# Patient Record
Sex: FEMALE | Race: WHITE | NOT HISPANIC OR LATINO | Employment: UNEMPLOYED | URBAN - METROPOLITAN AREA
[De-identification: names, ages, dates, MRNs, and addresses within clinical notes are randomized per-mention and may not be internally consistent; named-entity substitution may affect disease eponyms.]

---

## 2018-01-23 DIAGNOSIS — M25.531 RIGHT WRIST PAIN: Primary | ICD-10-CM

## 2018-01-25 ENCOUNTER — OFFICE VISIT (OUTPATIENT)
Dept: ORTHOPEDICS | Facility: CLINIC | Age: 43
End: 2018-01-25
Payer: OTHER GOVERNMENT

## 2018-01-25 ENCOUNTER — HOSPITAL ENCOUNTER (OUTPATIENT)
Dept: RADIOLOGY | Facility: HOSPITAL | Age: 43
Discharge: HOME | End: 2018-01-25
Attending: ORTHOPAEDIC SURGERY
Payer: OTHER GOVERNMENT

## 2018-01-25 VITALS
BODY MASS INDEX: 41.07 KG/M2 | WEIGHT: 271 LBS | SYSTOLIC BLOOD PRESSURE: 142 MMHG | HEIGHT: 68 IN | DIASTOLIC BLOOD PRESSURE: 72 MMHG | HEART RATE: 80 BPM

## 2018-01-25 DIAGNOSIS — M25.531 RIGHT WRIST PAIN: ICD-10-CM

## 2018-01-25 DIAGNOSIS — M19.031 DRUJ (DISTAL RADIOULNAR JOINT) ARTHROSIS, PRIMARY, RIGHT: Primary | ICD-10-CM

## 2018-01-25 DIAGNOSIS — M25.531 RIGHT WRIST PAIN: Primary | ICD-10-CM

## 2018-01-25 PROCEDURE — 99213 OFFICE O/P EST LOW 20 MIN: CPT | Mod: PBBFAC,25,PN | Performed by: ORTHOPAEDIC SURGERY

## 2018-01-25 PROCEDURE — 99999 PR PBB SHADOW E&M-EST. PATIENT-LVL III: CPT | Mod: PBBFAC,,, | Performed by: ORTHOPAEDIC SURGERY

## 2018-01-25 PROCEDURE — 73110 X-RAY EXAM OF WRIST: CPT | Mod: TC,PN,RT

## 2018-01-25 PROCEDURE — 99213 OFFICE O/P EST LOW 20 MIN: CPT | Mod: S$PBB,,, | Performed by: ORTHOPAEDIC SURGERY

## 2018-01-25 PROCEDURE — 73110 X-RAY EXAM OF WRIST: CPT | Mod: 26,RT,, | Performed by: RADIOLOGY

## 2018-01-25 RX ORDER — LEVOTHYROXINE SODIUM 50 UG/1
50 TABLET ORAL DAILY
COMMUNITY
End: 2020-01-21 | Stop reason: SDUPTHER

## 2018-01-25 NOTE — PROGRESS NOTES
History reviewed. No pertinent past medical history.    History reviewed. No pertinent surgical history.    Current Outpatient Prescriptions   Medication Sig    butalbital-acetaminophen-caffeine -40 mg (FIORICET, ESGIC) -40 mg per tablet     diphenoxylate-atropine 2.5-0.025 mg (LOMOTIL) 2.5-0.025 mg per tablet TAKE 1 TABLET BY MOUTH EVERY 8 TO 12 HOURS AS NEEDED    levothyroxine (SYNTHROID) 50 MCG tablet Take 50 mcg by mouth once daily.     No current facility-administered medications for this visit.        No Known Allergies    History reviewed. No pertinent family history.    Social History     Social History    Marital status: Single     Spouse name: N/A    Number of children: N/A    Years of education: N/A     Occupational History    Not on file.     Social History Main Topics    Smoking status: Never Smoker    Smokeless tobacco: Never Used    Alcohol use No    Drug use: No    Sexual activity: Not on file     Other Topics Concern    Not on file     Social History Narrative    No narrative on file       Chief Complaint:   Chief Complaint   Patient presents with    Right Wrist - Pain       History of present illness: This is a 42-year-old right-hand-dominant female seen for further right hand pain.  Patient's had right wrist pain now for about 2 years.  She thinks it started sometime in June of 2016.  There was no injury or trauma.  Ibuprofen helps.  She has pain but it is not constant.  Periodic swelling.  Pain when picking things up.  She rates her pain today as a 0 out of 10.  Symptoms are stable and mild to moderate.  Patient did have an MRI done at [facility] which showed DRUJ effusion and soft tissue edema.  I gave her a brace about a year and a half ago.  It helps to some degree.  She also has periodic numbness and tingling at night but most of her pain is near the dorsum of the DRUJ.      Review of Systems:    Constitution: Negative for chills, fever, and sweats.   Negative for unexplained weight loss.    HENT:  Negative for headaches and blurry vision.    Cardiovascular:Negative for chest pain or irregular heart beat. Negative for hypertension.    Respiratory:  Negative for cough and shortness of breath.    Gastrointestinal: Negative for abdominal pain, heartburn, melena, nausea, and vomitting.    Genitourinary:  Negative bladder incontinence and dysuria.    Musculoskeletal:  See HPI    Neurological: Positive for numbness.    Psychiatric/Behavioral: Negative for depression.  The patient is not nervous/anxious.      Endocrine: Negative for polyuria    Hematologic/Lymphatic: Negative for bleeding problem.  Does not bruise/bleed easily.    Skin: Negative for poor would healing and rash      Physical Examination:    Vital Signs:    Vitals:    01/25/18 0812   BP: (!) 142/72   Pulse: 80       Body mass index is 41.21 kg/m².    This a well-developed, well nourished patient in no acute distress.  They are alert and oriented and cooperative to examination.  Pt. walks without an antalgic gait.      Examination of the right hand and wrist shows no signs of rashes or erythema. Patient has very minimal swelling and prominence around the DRUJ and the dorsal ulnar aspect of the wrist.  Some laxity which shuck test.  Patient has full range of motion of the wrist in flexion and extension as well as ulnar and radial deviation. The patient also has full range of motion of all joints in the hand. There are 2+ radial pulse and intact light touch sensation in all 5 digits. Nontender over the anatomic snuffbox. Negative Tinel's. Negative Finkelstein's test.     Examination of the left hand and wrist shows no signs of rashes or erythema. Patient has no masses ecchymosis or effusions. Patient has full range of motion of the wrist in flexion and extension as well as ulnar and radial deviation. The patient also has full range of motion of all joints in the hand. There are 2+ radial pulse and intact  light touch sensation in all 5 digits. Nontender over the anatomic snuffbox. Negative Tinel's. Negative Finkelstein's test.     X-rays: 3 views of the right wrist are ordered and reviewed which show no abnormal findings    MRI of the right wrist performed at [facility] dated July 29, 2016: Effusion within the distal radial ulnar joint associated with mild adjacent soft tissue edema and perhaps minimal marrow edema within the distal radius.  Imaging is nonspecific.     Assessment:: Right DRUJ swelling and laxity    Plan:  I reviewed the x-rays and the MRI findings with her today.  I recommended some hand therapy to hopefully stabilize and strengthen the wrist.  We briefly talked about some kind of surgery for DRUJ stability.  The symptoms are not that bad to warrant that at this time.

## 2018-03-09 ENCOUNTER — CLINICAL SUPPORT (OUTPATIENT)
Dept: REHABILITATION | Facility: HOSPITAL | Age: 43
End: 2018-03-09
Attending: ORTHOPAEDIC SURGERY
Payer: OTHER GOVERNMENT

## 2018-03-09 DIAGNOSIS — G89.29 WRIST PAIN, CHRONIC, RIGHT: Primary | ICD-10-CM

## 2018-03-09 DIAGNOSIS — M25.531 WRIST PAIN, CHRONIC, RIGHT: Primary | ICD-10-CM

## 2018-03-09 PROCEDURE — 97110 THERAPEUTIC EXERCISES: CPT | Mod: PN

## 2018-03-09 PROCEDURE — G8988 SELF CARE GOAL STATUS: HCPCS | Mod: CH,PN

## 2018-03-09 PROCEDURE — G8987 SELF CARE CURRENT STATUS: HCPCS | Mod: CI,PN

## 2018-03-09 PROCEDURE — 97165 OT EVAL LOW COMPLEX 30 MIN: CPT | Mod: PN

## 2018-03-19 NOTE — PLAN OF CARE
TIME RECORD    Date: 03/09/2018    Start Time:  1100  Stop Time:  1145    PROCEDURES:    TIMED  Procedure Time Min.   exercise Start:1115  Stop:1145 30    Start:  Stop:     Start:  Stop:     Start:  Stop:          UNTIMED  Procedure Time Min.   evaluation Start:1100  Stop:1115 15    Start:  Stop:      Total Timed Minutes:  30  Total Timed Units:  15  Total Untimed Units:  2  Charges Billed/# of units:  1    OCCUPATIONAL THERAPY INITIAL EVALUATION & PLAN OF TREATMENT    Patient Name: Tori Escalona  Physician Name:  Kisha  Primary Diagnosis:  R wrist pain  Treatment Diagnosis:  same  Onset Date: > 3 months  Eval Date:  3-9-18  Certification Period:  3-9-18 to 6-9-18  Past Medical History: No past medical history on file.  Precautions:  universal  Prior Therapy:  no  Signs of Abuse: no  Medications: Tori Escalona has a current medication list which includes the following prescription(s): butalbital-acetaminophen-caffeine -40 mg, diphenoxylate-atropine 2.5-0.025 mg, and levothyroxine.  Nutrition:  WNL    Prior Level of Function: Independent  Social History:  Independent, works full time, lives with family  Place of Residence (steps/adaptations):  No concerns  Functional Deficits Leading to Referral/Nature of Injury:  Pain limits ADL at times  Patient Therapy Goals:  Pain free R wrist  Initial Assessment:  Pt presents with report of intermittent 3/10 pain. R wrist ROM WNL. Mild edema noted dorsum R wrist.      R UE (dominant) 50#  L UE 70#    Pinch R UE 15#  L UE 22#  R wrist flexion 45 degrees  R wrist extension 70 degrees  R radial deviation 18 degrees  R ulnar deviation 23 degrees      Rehab Potential: good  Short Term Goals 1. Compliant with HEP 2. Increase PROM WFL 3. Pain free ROM  Long Term Goals 1.Compliant with HEP including strengthening. 2. Pain free use R wrist/hhand 3. Equal circumference B wrists  Recommended Treatment Plan (2 times per week for 8 weeks): Therapeutic Exercise, Functional  Activities, Patient Education, Home Exercise Program and Manual Therapy  Other Recommendations:  Issued and instructed patient in gentle ROM and stretch of wrist flexion, extension and nerve gliding to be done daily.    Therapist's Name: GIORGI Spencer   Date: 03/19/2018    I CERTIFY THE NEED FOR THESE SERVICES FURNISHED UNDER THIS PLAN OF TREATMENT AND WHILE UNDER MY CARE    Physician's comments: ________________________________________________________________________________________________________________________________________________      Physician's Name: ___________________________________

## 2018-04-19 ENCOUNTER — CLINICAL SUPPORT (OUTPATIENT)
Dept: REHABILITATION | Facility: HOSPITAL | Age: 43
End: 2018-04-19
Attending: ORTHOPAEDIC SURGERY
Payer: OTHER GOVERNMENT

## 2018-04-19 DIAGNOSIS — G89.29 WRIST PAIN, CHRONIC, RIGHT: Primary | ICD-10-CM

## 2018-04-19 DIAGNOSIS — M25.531 WRIST PAIN, CHRONIC, RIGHT: Primary | ICD-10-CM

## 2018-04-19 PROCEDURE — 97140 MANUAL THERAPY 1/> REGIONS: CPT | Mod: PN

## 2018-04-19 PROCEDURE — 97110 THERAPEUTIC EXERCISES: CPT | Mod: PN

## 2018-04-19 NOTE — PROGRESS NOTES
TIME RECORD    Date:  04/19/2018    Start Time:  1010  Stop Time:  1053    PROCEDURES:    TIMED  Procedure Time Min.   exercise Start:1010  Stop:1053 43    Start:  Stop:     Start:  Stop:     Start:  Stop:          UNTIMED  Procedure Time Min.    Start:  Stop:     Start:  Stop:      Total Timed Minutes:  43  Total Timed Units:  3  Total Untimed Units:  0  Charges Billed/# of units:  3      Progress/Current Status    Subjective:     Patient ID: Tori Escalona is a 42 y.o. female.  Diagnosis:   1. Wrist pain, chronic, right       Pain: 3 /10      Objective:     Patient arrived to appointment (30 days since evaluation) Pt reports not doing HEP or regular icing but also reports R wrist pain unchanged. , pinch and ROM unchanged from evaluation date. Edema dorsum wrist. Instructed pt in tendon gliding exercises after applying US to right wrist.    Assessment:     First visit since evaluation. Pt has not been compliant with initial recommndations.    Patient Education/Response:     ongoing    Plans and Goals:     Refer to Tommy VINCENT

## 2018-04-30 ENCOUNTER — CLINICAL SUPPORT (OUTPATIENT)
Dept: REHABILITATION | Facility: HOSPITAL | Age: 43
End: 2018-04-30
Attending: ORTHOPAEDIC SURGERY
Payer: OTHER GOVERNMENT

## 2018-04-30 DIAGNOSIS — M25.431 RIGHT WRIST EFFUSION: ICD-10-CM

## 2018-04-30 DIAGNOSIS — M25.631 STIFFNESS OF JOINT OF RIGHT FOREARM: Primary | ICD-10-CM

## 2018-04-30 DIAGNOSIS — M25.531 RIGHT WRIST PAIN: ICD-10-CM

## 2018-04-30 PROCEDURE — 98960 EDU&TRN PT SELF-MGMT NQHP 1: CPT | Mod: PN

## 2018-04-30 PROCEDURE — 97110 THERAPEUTIC EXERCISES: CPT | Mod: PN

## 2018-04-30 NOTE — PROGRESS NOTES
"Time in 9  Time out 945    untimed units    1 on 1 education                               Time:9-930    Timed units  1 therex                              Time:930-945      S:reports no trauma she can remember to correspond to symptoms she currently has however states broke r wrist when she was a child  Pain:  Diffuse ulna sides ache; rest 0/10, use up to 4 especially with active forearm rotation, sleep 0    O:    Prom               r                l  Sup/pro       70/90         90/90  Df/pf            90/90         90/90  Rd/ud          20/50         30/60    Wrist circ r 7", l 7"    Mild edema noted at r ulna styloid vs l    Explained special tests to pinpoint pain generator may be useful once sup hypomobility resolved, explained a druj brace will be ordered (see last ortho note for dx)    Told to avoid heavy or painful use, told to avoid rotation of forearm into supination due to stiffness present and fear of overworking ecu since this contributes to sup)    Sup stretches as tolerated-pain free, issued these for HEP    A:resolving sup stiffness should be first priority            P:special tests for ulna side wrist pain once sup hypomobility gone    6-21-18: d/c since patient had not attended since 4-30-18  "

## 2020-01-09 ENCOUNTER — TELEPHONE (OUTPATIENT)
Dept: FAMILY MEDICINE | Facility: CLINIC | Age: 45
End: 2020-01-09

## 2020-01-09 NOTE — TELEPHONE ENCOUNTER
----- Message from Rupal Alcantara sent at 1/9/2020  1:44 PM CST -----  Contact: Patient   VM @ patient called in regards to a referral stated she is scheduled for 1-21-20 @ 7:40 and is needing the referral sent before that [phone]

## 2020-01-10 ENCOUNTER — TELEPHONE (OUTPATIENT)
Dept: FAMILY MEDICINE | Facility: CLINIC | Age: 45
End: 2020-01-10

## 2020-01-10 DIAGNOSIS — E03.9 HYPOTHYROIDISM (ACQUIRED): ICD-10-CM

## 2020-01-10 DIAGNOSIS — Z00.00 ROUTINE GENERAL MEDICAL EXAMINATION AT A HEALTH CARE FACILITY: Primary | ICD-10-CM

## 2020-01-10 DIAGNOSIS — Z79.899 ENCOUNTER FOR LONG-TERM (CURRENT) USE OF OTHER MEDICATIONS: ICD-10-CM

## 2020-01-18 LAB
ALBUMIN SERPL-MCNC: 4.3 G/DL (ref 3.6–5.1)
ALBUMIN/GLOB SERPL: 1.4 (CALC) (ref 1–2.5)
ALP SERPL-CCNC: 54 U/L (ref 33–115)
ALT SERPL-CCNC: 13 U/L (ref 6–29)
AST SERPL-CCNC: 13 U/L (ref 10–30)
BASOPHILS # BLD AUTO: 31 CELLS/UL (ref 0–200)
BASOPHILS NFR BLD AUTO: 0.5 %
BILIRUB SERPL-MCNC: 0.6 MG/DL (ref 0.2–1.2)
BUN SERPL-MCNC: 13 MG/DL (ref 7–25)
BUN/CREAT SERPL: NORMAL (CALC) (ref 6–22)
CALCIUM SERPL-MCNC: 9.1 MG/DL (ref 8.6–10.2)
CHLORIDE SERPL-SCNC: 105 MMOL/L (ref 98–110)
CHOLEST SERPL-MCNC: 140 MG/DL
CHOLEST/HDLC SERPL: 3.6 (CALC)
CO2 SERPL-SCNC: 28 MMOL/L (ref 20–32)
CREAT SERPL-MCNC: 0.75 MG/DL (ref 0.5–1.1)
EOSINOPHIL # BLD AUTO: 110 CELLS/UL (ref 15–500)
EOSINOPHIL NFR BLD AUTO: 1.8 %
ERYTHROCYTE [DISTWIDTH] IN BLOOD BY AUTOMATED COUNT: 12 % (ref 11–15)
GFRSERPLBLD MDRD-ARVRAT: 98 ML/MIN/1.73M2
GLOBULIN SER CALC-MCNC: 3.1 G/DL (CALC) (ref 1.9–3.7)
GLUCOSE SERPL-MCNC: 93 MG/DL (ref 65–99)
HCT VFR BLD AUTO: 39.8 % (ref 35–45)
HDLC SERPL-MCNC: 39 MG/DL
HGB BLD-MCNC: 13.6 G/DL (ref 11.7–15.5)
LDLC SERPL CALC-MCNC: 85 MG/DL (CALC)
LYMPHOCYTES # BLD AUTO: 1732 CELLS/UL (ref 850–3900)
LYMPHOCYTES NFR BLD AUTO: 28.4 %
MCH RBC QN AUTO: 29.1 PG (ref 27–33)
MCHC RBC AUTO-ENTMCNC: 34.2 G/DL (ref 32–36)
MCV RBC AUTO: 85 FL (ref 80–100)
MONOCYTES # BLD AUTO: 421 CELLS/UL (ref 200–950)
MONOCYTES NFR BLD AUTO: 6.9 %
NEUTROPHILS # BLD AUTO: 3806 CELLS/UL (ref 1500–7800)
NEUTROPHILS NFR BLD AUTO: 62.4 %
NONHDLC SERPL-MCNC: 101 MG/DL (CALC)
PLATELET # BLD AUTO: 311 THOUSAND/UL (ref 140–400)
PMV BLD REES-ECKER: 10.3 FL (ref 7.5–12.5)
POTASSIUM SERPL-SCNC: 4.3 MMOL/L (ref 3.5–5.3)
PROT SERPL-MCNC: 7.4 G/DL (ref 6.1–8.1)
RBC # BLD AUTO: 4.68 MILLION/UL (ref 3.8–5.1)
SODIUM SERPL-SCNC: 137 MMOL/L (ref 135–146)
TRIGL SERPL-MCNC: 71 MG/DL
TSH SERPL-ACNC: 1.88 MIU/L
WBC # BLD AUTO: 6.1 THOUSAND/UL (ref 3.8–10.8)

## 2020-01-21 ENCOUNTER — OFFICE VISIT (OUTPATIENT)
Dept: FAMILY MEDICINE | Facility: CLINIC | Age: 45
End: 2020-01-21
Payer: OTHER GOVERNMENT

## 2020-01-21 VITALS
HEART RATE: 80 BPM | HEIGHT: 68 IN | WEIGHT: 278 LBS | DIASTOLIC BLOOD PRESSURE: 88 MMHG | BODY MASS INDEX: 42.13 KG/M2 | SYSTOLIC BLOOD PRESSURE: 128 MMHG

## 2020-01-21 DIAGNOSIS — R03.0 PREHYPERTENSION: ICD-10-CM

## 2020-01-21 DIAGNOSIS — Z00.00 ROUTINE PHYSICAL EXAMINATION: ICD-10-CM

## 2020-01-21 DIAGNOSIS — E03.9 ACQUIRED HYPOTHYROIDISM: Primary | ICD-10-CM

## 2020-01-21 PROCEDURE — 99396 PREV VISIT EST AGE 40-64: CPT | Mod: S$GLB,,, | Performed by: PHYSICIAN ASSISTANT

## 2020-01-21 PROCEDURE — 99396 PR PREVENTIVE VISIT,EST,40-64: ICD-10-PCS | Mod: S$GLB,,, | Performed by: PHYSICIAN ASSISTANT

## 2020-01-21 RX ORDER — LEVOTHYROXINE SODIUM 50 UG/1
50 TABLET ORAL DAILY
Qty: 90 TABLET | Refills: 1 | Status: SHIPPED | OUTPATIENT
Start: 2020-01-21 | End: 2020-07-20 | Stop reason: SDUPTHER

## 2020-01-21 NOTE — PROGRESS NOTES
SUBJECTIVE:    Patient ID: Tori Escalona is a 44 y.o. female.    Chief Complaint: Annual Exam (Pt presents for annual exam.....mlr) and Medication Refill (Med bottle present and reconciled.....pharmacy verified.....mlr)    44-year-old white female presents today for regular checkup.  She reports she has been doing well overall with no major concerns or complaints at this time.  She just had some blood work done for my review. Just treated for hypothyroidism.  TSH appears to be in range. She says that she has been trying to do a low carb type diet to help with weight loss. She feels like she is doing better. Bp has never been elevated and today in the borderline range. Pt reports good amount of stress this morning getting out of the house with sick child. Wishes to watch it closely over the next few months.      Telephone on 01/10/2020   Component Date Value Ref Range Status    WBC 01/17/2020 6.1  3.8 - 10.8 Thousand/uL Final    RBC 01/17/2020 4.68  3.80 - 5.10 Million/uL Final    Hemoglobin 01/17/2020 13.6  11.7 - 15.5 g/dL Final    Hematocrit 01/17/2020 39.8  35.0 - 45.0 % Final    Mean Corpuscular Volume 01/17/2020 85.0  80.0 - 100.0 fL Final    Mean Corpuscular Hemoglobin 01/17/2020 29.1  27.0 - 33.0 pg Final    Mean Corpuscular Hemoglobin Conc 01/17/2020 34.2  32.0 - 36.0 g/dL Final    RDW 01/17/2020 12.0  11.0 - 15.0 % Final    Platelets 01/17/2020 311  140 - 400 Thousand/uL Final    MPV 01/17/2020 10.3  7.5 - 12.5 fL Final    Neutrophils Absolute 01/17/2020 3,806  1,500 - 7,800 cells/uL Final    Lymph # 01/17/2020 1,732  850 - 3,900 cells/uL Final    Mono # 01/17/2020 421  200 - 950 cells/uL Final    Eos # 01/17/2020 110  15 - 500 cells/uL Final    Baso # 01/17/2020 31  0 - 200 cells/uL Final    Neutrophils Relative 01/17/2020 62.4  % Final    Lymph% 01/17/2020 28.4  % Final    Mono% 01/17/2020 6.9  % Final    Eosinophil% 01/17/2020 1.8  % Final    Basophil% 01/17/2020 0.5  % Final     Glucose 01/17/2020 93  65 - 99 mg/dL Final    BUN, Bld 01/17/2020 13  7 - 25 mg/dL Final    Creatinine 01/17/2020 0.75  0.50 - 1.10 mg/dL Final    eGFR if non African American 01/17/2020 98  > OR = 60 mL/min/1.73m2 Final    eGFR if African American 01/17/2020 113  > OR = 60 mL/min/1.73m2 Final    BUN/Creatinine Ratio 01/17/2020 NOT APPLICABLE  6 - 22 (calc) Final    Sodium 01/17/2020 137  135 - 146 mmol/L Final    Potassium 01/17/2020 4.3  3.5 - 5.3 mmol/L Final    Chloride 01/17/2020 105  98 - 110 mmol/L Final    CO2 01/17/2020 28  20 - 32 mmol/L Final    Calcium 01/17/2020 9.1  8.6 - 10.2 mg/dL Final    Total Protein 01/17/2020 7.4  6.1 - 8.1 g/dL Final    Albumin 01/17/2020 4.3  3.6 - 5.1 g/dL Final    Globulin, Total 01/17/2020 3.1  1.9 - 3.7 g/dL (calc) Final    Albumin/Globulin Ratio 01/17/2020 1.4  1.0 - 2.5 (calc) Final    Total Bilirubin 01/17/2020 0.6  0.2 - 1.2 mg/dL Final    Alkaline Phosphatase 01/17/2020 54  33 - 115 U/L Final    AST 01/17/2020 13  10 - 30 U/L Final    ALT 01/17/2020 13  6 - 29 U/L Final    Cholesterol 01/17/2020 140  <200 mg/dL Final    HDL 01/17/2020 39* >50 mg/dL Final    Triglycerides 01/17/2020 71  <150 mg/dL Final    LDL Cholesterol 01/17/2020 85  mg/dL (calc) Final    Hdl/Cholesterol Ratio 01/17/2020 3.6  <5.0 (calc) Final    Non HDL Chol. (LDL+VLDL) 01/17/2020 101  <130 mg/dL (calc) Final    TSH 01/17/2020 1.88  mIU/L Final       Past Medical History:   Diagnosis Date    Thyroid disease      History reviewed. No pertinent surgical history.  Family History   Problem Relation Age of Onset    Osteoporosis Mother     Hypertension Father        Marital Status:   Alcohol History:  reports that she does not drink alcohol.  Tobacco History:  reports that she has never smoked. She has never used smokeless tobacco.  Drug History:  reports that she does not use drugs.    Review of patient's allergies indicates:  No Known Allergies    Current Outpatient  "Medications:     levothyroxine (SYNTHROID) 50 MCG tablet, Take 1 tablet (50 mcg total) by mouth once daily., Disp: 90 tablet, Rfl: 1    Review of Systems   Constitutional: Negative for appetite change, chills, fatigue, fever and unexpected weight change.   HENT: Negative for congestion.    Respiratory: Negative for cough, chest tightness and shortness of breath.    Cardiovascular: Negative for chest pain and palpitations.   Gastrointestinal: Negative for abdominal distention and abdominal pain.   Endocrine: Negative for cold intolerance and heat intolerance.   Genitourinary: Negative for difficulty urinating and dysuria.   Musculoskeletal: Negative for arthralgias and back pain.   Neurological: Negative for dizziness, weakness and headaches.          Objective:      Vitals:    01/21/20 0753 01/21/20 0801 01/21/20 0812   BP: (!) 132/100 (!) 128/98 128/88   Pulse: 80     Weight: 126.1 kg (278 lb)     Height: 5' 8" (1.727 m)       Physical Exam   Constitutional: She is oriented to person, place, and time. She appears well-developed and well-nourished. No distress.   HENT:   Head: Normocephalic and atraumatic.   Eyes: Pupils are equal, round, and reactive to light. Conjunctivae and EOM are normal.   Neck: Normal range of motion. Neck supple. No thyromegaly present.   Cardiovascular: Normal rate, regular rhythm, normal heart sounds and intact distal pulses.   Pulmonary/Chest: Effort normal and breath sounds normal.   Abdominal: Soft. Bowel sounds are normal. She exhibits no distension. There is no tenderness.   Musculoskeletal: Normal range of motion.   Neurological: She is alert and oriented to person, place, and time. No cranial nerve deficit.   Skin: Skin is warm and dry. No erythema.   Psychiatric: She has a normal mood and affect.         Assessment:       1. Acquired hypothyroidism    2. Routine physical examination    3. Prehypertension         Plan:       Acquired hypothyroidism  Comments:  TSH at goal. refills " of current dose. continue as is.  Orders:  -     levothyroxine (SYNTHROID) 50 MCG tablet; Take 1 tablet (50 mcg total) by mouth once daily.  Dispense: 90 tablet; Refill: 1    Routine physical examination  Comments:  She is doing well overall. we discussed borderline pressure and keeping an eye at home. Low carb diet.    Prehypertension  Comments:  will followup in 6 mos or sooner if she gets high readings at home.      Follow up in about 6 months (around 7/21/2020) for BP Check-Up.        1/21/2020 Rachid Nevarez PA-C

## 2020-01-21 NOTE — PATIENT INSTRUCTIONS
Hypothyroidism       You have hypothyroidism. This means your thyroid gland is not making enough thyroid hormone. This hormone is vital to body growth and metabolism. If you dont make enough, many body processes slow down. This can cause symptoms throughout the body. Hypothyroidism can range from mild to severe. The most severe form is called myxedema.  There are a number of causes of hypothyroidism. A common cause is Hashimotos disease. This disease causes the bodys own immune system to attack the thyroid gland. When you have certain treatments, such as surgery to remove the thyroid gland, this can also cause hypothyroidism.  Symptoms of hypothyroidism can include:  · Fatigue  · Trouble concentrating or thinking clearly; forgetfulness  · Dry skin  · Hair loss  · Weight gain  · Low tolerance to cold  · Constipation  · Depression  · Personality changes  · Tingling or prickling of the hands or feet  · Heavy, absent, or irregular periods (women only)  Older adults may sometimes have other symptoms. These can include:  · Muscle aches and weakness  · Confusion  · Incontinence (unable to control urine or stool)  · Trouble moving around  · Falling  Treatment for hypothyroidism involves taking thyroid hormone pills daily. These pills replace the hormone your thyroid doesnt make. You will likely need to take a daily pill for the rest of your life. Tips for taking this medicine are given below.  Home care  Tips for taking your medicine  · Take your thyroid hormone pills as prescribed by your healthcare provider. This is most often 1 pill a day on an empty stomach. Use a pillbox labeled with the days of the week. This will help you remember to take your pill each day.  · Dont take products that contain iron and calcium or antacids within 4 hours of taking your thyroid hormone pills.  · Dont take other medicines with your thyroid hormone pill without checking with your provider first.  · Tell your provider if you have  any side effects from your medicines that bother you.  · Never change the dosage or stop taking your thyroid pills without talking to your provider first.  General care  · Always talk with your provider before trying other medicines or treatments for your thyroid problem.  · If you see other healthcare providers, be sure to let them know about your thyroid problem.  Follow-up care  See your healthcare provider for checkups as advised. You may need regular tests to check the level of thyroid hormone in your blood.  When to seek medical advice  Call your healthcare provider right away if any of these occur:  · New symptoms develop  · Symptoms return, continue, or worsen even after treatment  · Extreme fatigue  · Puffy hands, face, or feet  · Fast or irregular heartbeat  · Confusion  Call 911  Call 911 right away if any of these occur:  · Fainting  · Chest pain  · Shortness of breath or trouble breathing  Date Last Reviewed: 8/24/2015  © 1357-2211 [employer]. [address] [place] [address], [place] [address]. All rights reserved. This information is not intended as a substitute for professional medical care. Always follow your healthcare professional's instructions.

## 2020-03-18 ENCOUNTER — TELEPHONE (OUTPATIENT)
Dept: FAMILY MEDICINE | Facility: CLINIC | Age: 45
End: 2020-03-18

## 2020-03-18 NOTE — TELEPHONE ENCOUNTER
Patient to continue to use OTC for another day or so, and if not better, will schedule a virtual visit.

## 2020-03-18 NOTE — TELEPHONE ENCOUNTER
----- Message from Anna Bates MA sent at 3/18/2020  2:52 PM CDT -----  Pt called in to report she is having a headache and sinus pressure with mild congestion.  She is taking OTC allergy medications (generic Sudafed), but was wondering if she needs to be taking something else.  Please contact patient    [facility] - [facility] on     Pt - [phone]

## 2020-03-23 ENCOUNTER — TELEPHONE (OUTPATIENT)
Dept: FAMILY MEDICINE | Facility: CLINIC | Age: 45
End: 2020-03-23

## 2020-03-23 NOTE — TELEPHONE ENCOUNTER
----- Message from Anna Bates MA sent at 3/23/2020  3:37 PM CDT -----  Pt would like a call back to set up a virtual visit,    Pt - [phone]

## 2020-03-24 ENCOUNTER — OFFICE VISIT (OUTPATIENT)
Dept: FAMILY MEDICINE | Facility: CLINIC | Age: 45
End: 2020-03-24
Payer: OTHER GOVERNMENT

## 2020-03-24 DIAGNOSIS — J01.00 ACUTE MAXILLARY SINUSITIS, RECURRENCE NOT SPECIFIED: Primary | ICD-10-CM

## 2020-03-24 PROCEDURE — 99213 OFFICE O/P EST LOW 20 MIN: CPT | Mod: 95,,, | Performed by: PHYSICIAN ASSISTANT

## 2020-03-24 PROCEDURE — 99213 PR OFFICE/OUTPT VISIT, EST, LEVL III, 20-29 MIN: ICD-10-PCS | Mod: 95,,, | Performed by: PHYSICIAN ASSISTANT

## 2020-03-24 RX ORDER — LEVOCETIRIZINE DIHYDROCHLORIDE 5 MG/1
5 TABLET, FILM COATED ORAL NIGHTLY
Qty: 30 TABLET | Refills: 11 | Status: SHIPPED | OUTPATIENT
Start: 2020-03-24 | End: 2022-07-14

## 2020-03-24 RX ORDER — AMOXICILLIN AND CLAVULANATE POTASSIUM 875; 125 MG/1; MG/1
1 TABLET, FILM COATED ORAL EVERY 12 HOURS
Qty: 20 TABLET | Refills: 0 | Status: SHIPPED | OUTPATIENT
Start: 2020-03-24 | End: 2020-04-03

## 2020-03-24 NOTE — PROGRESS NOTES
Subjective:        The chief complaint leading to consultation is: Sinus/allergy symptoms  The patient location is:  HOME  Visit type: Virtual visit with synchronous audio and video    44-year-old female presents for urgent visit via telemedicine conference.  She reports that she has had a week and a half worth of allergy and sinus symptoms.  This results and frontal and maxillary sinus pressure.  Her nasal drainage has changed from clear and thin to thicker and yellowish green in color.  She denies any fever or chills she also denies cough or feeling particularly short of breath.  She denies any sick contacts.  Her sons are off school and at home with her.  They are also suffering from allergies.  She has been using Mucinex 600 mg p.o. b.i.d. with some relief.      History reviewed. No pertinent surgical history.  Past Medical History:   Diagnosis Date    Thyroid disease      Family History   Problem Relation Age of Onset    Osteoporosis Mother     Hypertension Father         Social History:   Marital Status:   Alcohol History:  reports that she does not drink alcohol.  Tobacco History:  reports that she has never smoked. She has never used smokeless tobacco.  Drug History:  reports that she does not use drugs.    Review of patient's allergies indicates:  No Known Allergies    Current Outpatient Medications   Medication Sig Dispense Refill    amoxicillin-clavulanate 875-125mg (AUGMENTIN) 875-125 mg per tablet Take 1 tablet by mouth every 12 (twelve) hours. for 10 days 20 tablet 0    levocetirizine (XYZAL) 5 MG tablet Take 1 tablet (5 mg total) by mouth every evening. 30 tablet 11    levothyroxine (SYNTHROID) 50 MCG tablet Take 1 tablet (50 mcg total) by mouth once daily. 90 tablet 1     No current facility-administered medications for this visit.        Review of Systems   Constitutional: Negative for chills, fatigue and fever.   HENT: Positive for congestion, sinus pressure, sinus pain and  sneezing. Negative for ear discharge, ear pain, rhinorrhea, sore throat and trouble swallowing.    Eyes: Negative for pain, discharge, redness and itching.   Respiratory: Negative for cough, chest tightness and shortness of breath.    Cardiovascular: Negative for chest pain.   Gastrointestinal: Negative for abdominal distention and abdominal pain.   Neurological: Negative for weakness and headaches.         Objective:        Physical Exam:   Physical Exam   Constitutional: She appears well-developed and well-nourished. No distress.   HENT:   Head: Normocephalic and atraumatic.   Nose: Rhinorrhea present. Right sinus exhibits maxillary sinus tenderness and frontal sinus tenderness. Left sinus exhibits maxillary sinus tenderness and frontal sinus tenderness.            Assessment:       1. Acute maxillary sinusitis, recurrence not specified      Plan:   Acute maxillary sinusitis, recurrence not specified  Comments:  Going to go ahead and treat with Augmentin.  Will also send in a prescription for Xyzal.  Rest and push fluids.  If symptoms worsen she will call  Orders:  -     amoxicillin-clavulanate 875-125mg (AUGMENTIN) 875-125 mg per tablet; Take 1 tablet by mouth every 12 (twelve) hours. for 10 days  Dispense: 20 tablet; Refill: 0  -     levocetirizine (XYZAL) 5 MG tablet; Take 1 tablet (5 mg total) by mouth every evening.  Dispense: 30 tablet; Refill: 11      Follow up if symptoms worsen or fail to improve.    Total time spent with patient: 15 min    Each patient to whom he or she provides medical services by telemedicine is:  (1) informed of the relationship between the physician and patient and the respective role of any other health care provider with respect to management of the patient; and (2) notified that he or she may decline to receive medical services by telemedicine and may withdraw from such care at any time.    This note was created using [employer] voice recognition software that occasionally  misinterprets phrases or words.

## 2020-03-24 NOTE — PATIENT INSTRUCTIONS
Sinusitis (Antibiotic Treatment)    The sinuses are air-filled spaces within the bones of the face. They connect to the inside of the nose. Sinusitis is an inflammation of the tissue lining the sinus cavity. Sinus inflammation can occur during a cold. It can also be due to allergies to pollens and other particles in the air. Sinusitis can cause symptoms of sinus congestion and fullness. A sinus infection causes fever, headache and facial pain. There is often green or yellow drainage from the nose or into the back of the throat (post-nasal drip). You have been given antibiotics to treat this condition.  Home care:  · Take the full course of antibiotics as instructed. Do not stop taking them, even if you feel better.  · Drink plenty of water, hot tea, and other liquids. This may help thin mucus. It also may promote sinus drainage.  · Heat may help soothe painful areas of the face. Use a towel soaked in hot water. Or,  the shower and direct the hot spray onto your face. Using a vaporizer along with a menthol rub at night may also help.   · An expectorant containing guaifenesin may help thin the mucus and promote drainage from the sinuses.  · Over-the-counter decongestants may be used unless a similar medicine was prescribed. Nasal sprays work the fastest. Use one that contains phenylephrine or oxymetazoline. First blow the nose gently. Then use the spray. Do not use these medicines more often than directed on the label or symptoms may get worse. You may also use tablets containing pseudoephedrine. Avoid products that combine ingredients, because side effects may be increased. Read labels. You can also ask the pharmacist for help. (NOTE: Persons with high blood pressure should not use decongestants. They can raise blood pressure.)  · Over-the-counter antihistamines may help if allergies contributed to your sinusitis.    · Do not use nasal rinses or irrigation during an acute sinus infection, unless told to by  your health care provider. Rinsing may spread the infection to other sinuses.  · Use acetaminophen or ibuprofen to control pain, unless another pain medicine was prescribed. (If you have chronic liver or kidney disease or ever had a stomach ulcer, talk with your doctor before using these medicines. Aspirin should never be used in anyone under 18 years of age who is ill with a fever. It may cause severe liver damage.)  · Don't smoke. This can worsen symptoms.  Follow-up care  Follow up with your healthcare provider or our staff if you are not improving within the next week.  When to seek medical advice  Call your healthcare provider if any of these occur:  · Facial pain or headache becoming more severe  · Stiff neck  · Unusual drowsiness or confusion  · Swelling of the forehead or eyelids  · Vision problems, including blurred or double vision  · Fever of 100.4ºF (38ºC) or higher, or as directed by your healthcare provider  · Seizure  · Breathing problems  · Symptoms not resolving within 10 days  Date Last Reviewed: 4/13/2015  © 3161-5741 [employer]. [address] [place] [address], [place] [address]. All rights reserved. This information is not intended as a substitute for professional medical care. Always follow your healthcare professional's instructions.

## 2020-04-18 ENCOUNTER — PATIENT MESSAGE (OUTPATIENT)
Dept: FAMILY MEDICINE | Facility: CLINIC | Age: 45
End: 2020-04-18

## 2020-04-19 ENCOUNTER — OFFICE VISIT (OUTPATIENT)
Dept: URGENT CARE | Facility: CLINIC | Age: 45
End: 2020-04-19
Payer: OTHER GOVERNMENT

## 2020-04-19 ENCOUNTER — CLINICAL SUPPORT (OUTPATIENT)
Dept: URGENT CARE | Facility: CLINIC | Age: 45
End: 2020-04-19
Payer: OTHER GOVERNMENT

## 2020-04-19 VITALS
RESPIRATION RATE: 16 BRPM | TEMPERATURE: 98 F | OXYGEN SATURATION: 98 % | WEIGHT: 278 LBS | HEART RATE: 94 BPM | DIASTOLIC BLOOD PRESSURE: 100 MMHG | BODY MASS INDEX: 42.13 KG/M2 | SYSTOLIC BLOOD PRESSURE: 152 MMHG | HEIGHT: 68 IN

## 2020-04-19 DIAGNOSIS — B34.9 VIRAL ILLNESS: ICD-10-CM

## 2020-04-19 DIAGNOSIS — R05.9 COUGH: Primary | ICD-10-CM

## 2020-04-19 DIAGNOSIS — R06.02 SHORTNESS OF BREATH: Primary | ICD-10-CM

## 2020-04-19 LAB — SARS-COV-2 RNA RESP QL NAA+PROBE: NOT DETECTED

## 2020-04-19 PROCEDURE — 99213 PR OFFICE/OUTPT VISIT, EST, LEVL III, 20-29 MIN: ICD-10-PCS | Mod: S$GLB,,, | Performed by: NURSE PRACTITIONER

## 2020-04-19 PROCEDURE — 71046 XR CHEST PA AND LATERAL: ICD-10-PCS | Mod: S$GLB,,, | Performed by: RADIOLOGY

## 2020-04-19 PROCEDURE — 99213 OFFICE O/P EST LOW 20 MIN: CPT | Mod: S$GLB,,, | Performed by: NURSE PRACTITIONER

## 2020-04-19 PROCEDURE — U0002 COVID-19 LAB TEST NON-CDC: HCPCS

## 2020-04-19 PROCEDURE — 71046 X-RAY EXAM CHEST 2 VIEWS: CPT | Mod: S$GLB,,, | Performed by: RADIOLOGY

## 2020-04-19 RX ORDER — ALBUTEROL SULFATE 90 UG/1
2 AEROSOL, METERED RESPIRATORY (INHALATION) EVERY 4 HOURS PRN
Qty: 1 G | Refills: 0 | Status: SHIPPED | OUTPATIENT
Start: 2020-04-19 | End: 2020-08-06

## 2020-04-19 RX ORDER — BENZONATATE 100 MG/1
100 CAPSULE ORAL EVERY 6 HOURS PRN
Qty: 30 CAPSULE | Refills: 1 | Status: SHIPPED | OUTPATIENT
Start: 2020-04-19 | End: 2020-08-06

## 2020-04-19 RX ORDER — AZITHROMYCIN 250 MG/1
250 TABLET, FILM COATED ORAL DAILY
Qty: 6 TABLET | Refills: 0 | Status: SHIPPED | OUTPATIENT
Start: 2020-04-19 | End: 2020-04-25

## 2020-04-19 RX ORDER — AZELASTINE 1 MG/ML
1 SPRAY, METERED NASAL 2 TIMES DAILY
Qty: 30 ML | Refills: 0 | Status: SHIPPED | OUTPATIENT
Start: 2020-04-19 | End: 2020-08-06

## 2020-04-19 NOTE — LETTER
[address] [address], [address] ? [place] [address] ? Phone [phone] ? Fax [phone] ? [email or web address]          Return to Work/School    Patient: Tori Escalona  YOB: 1975   Date: 04/19/2020      To Whom It May Concern:     Tori Escalona was in contact with/seen in my office on 04/19/2020.      Tori Escalona has met the criteria for COVID-19 testing based upon symptoms, travel, and/or potential exposure. The test has been completed and is pending results at this time. During this time the employee is not able to work and should be quarantined per the Centers for Disease Control timelines.      If you have any questions or concerns, or if I can be of further assistance, please do not hesitate to contact me.     Sincerely,    NURSE [facility]

## 2020-04-19 NOTE — PATIENT INSTRUCTIONS
Please go to [facility] for Covid testing. Call when you arrive    [phone]    Please drink plenty of fluids.    Please get plenty of rest.    Please return here or go to the Emergency Department for any concerns or worsening of condition.    If you were given wait & see antibiotics, please wait 3-5 days before taking them, and only take them if your symptoms have worsened or not improved.  If you do begin taking the antibiotics, please take them to completion.  If you were prescribed antibiotics, please take them to completion.    If you were prescribed a narcotic medication, do not drive or operate heavy equipment or machinery while taking these medications.    If you do not have Hypertension or any history of palpitations, it is ok to take over the counter Sudafed or Mucinex D or Allegra-D or Claritin-D or Zyrtec-D.  If you do take one of the above, it is ok to combine that with plain over the counter Mucinex or Allegra or Claritin or Zyrtec.  If for example you are taking Zyrtec -D, you can combine that with Mucinex, but not Mucinex-D.  If you are taking Mucinex-D, you can combine that with plain Allegra or Claritin or Zyrtec.     If you do have Hypertension or palpitations, it is safe to take Coricidin HBP for relief of sinus symptoms.  We recommend you take over the counter Flonase (Fluticasone) or another nasally inhaled steroid unless you are already taking one.    Nasal irrigation with a saline spray or Netti Pot like device per their directions is also recommended.  If not allergic, please take over the counter Tylenol (Acetaminophen) and/or Motrin (Ibuprofen) as directed for control of pain and/or fever.    Please follow up with your primary care doctor or specialist as needed.

## 2020-04-20 ENCOUNTER — PATIENT MESSAGE (OUTPATIENT)
Dept: FAMILY MEDICINE | Facility: CLINIC | Age: 45
End: 2020-04-20

## 2020-04-21 ENCOUNTER — OFFICE VISIT (OUTPATIENT)
Dept: FAMILY MEDICINE | Facility: CLINIC | Age: 45
End: 2020-04-21
Payer: OTHER GOVERNMENT

## 2020-04-21 DIAGNOSIS — I10 ESSENTIAL HYPERTENSION: Primary | ICD-10-CM

## 2020-04-21 PROCEDURE — 99213 PR OFFICE/OUTPT VISIT, EST, LEVL III, 20-29 MIN: ICD-10-PCS | Mod: 95,,, | Performed by: PHYSICIAN ASSISTANT

## 2020-04-21 PROCEDURE — 99213 OFFICE O/P EST LOW 20 MIN: CPT | Mod: 95,,, | Performed by: PHYSICIAN ASSISTANT

## 2020-04-21 RX ORDER — OLMESARTAN MEDOXOMIL 20 MG/1
20 TABLET ORAL DAILY
Qty: 30 TABLET | Refills: 2 | Status: SHIPPED | OUTPATIENT
Start: 2020-04-21 | End: 2020-07-20 | Stop reason: SDUPTHER

## 2020-04-21 NOTE — PROGRESS NOTES
Subjective:        The chief complaint leading to consultation is: HTN  The patient location is:  Home  Visit type: Virtual visit with synchronous audio/video or audio only  This was a video visit in lieu of in-person visit due to the coronavirus emergency. Patient acknowledged and consented to the video visit encounter.     This is a 44-year-old white female who presents today via virtual telemedicine conference.  She reports that she has recently been evaluated again at an urgent care for bronchitis/asthma.  Says she was given albuterol inhaler that has helped.  It was noted that her pressure continued to remain elevated at the urgent care.  152/100. It was also borderline elevated here at the last visit and at home when I spoke to her.  She did have a chest x-ray done and COVID-19 testing done.  Both were negative.  She is interested in starting a blood pressure medicine.  She plans to get a blood pressure monitor herself and touch base with me in 2-3 weeks.      History reviewed. No pertinent surgical history.  Past Medical History:   Diagnosis Date    Thyroid disease      Family History   Problem Relation Age of Onset    Osteoporosis Mother     Hypertension Father         Social History:   Marital Status:   Alcohol History:  reports that she does not drink alcohol.  Tobacco History:  reports that she has never smoked. She has never used smokeless tobacco.  Drug History:  reports that she does not use drugs.    Review of patient's allergies indicates:  No Known Allergies    Current Outpatient Medications   Medication Sig Dispense Refill    albuterol (PROVENTIL/VENTOLIN HFA) 90 mcg/actuation inhaler Inhale 2 puffs into the lungs every 4 (four) hours as needed for Wheezing. Rescue 1 g 0    azelastine (ASTELIN) 137 mcg (0.1 %) nasal spray 1 spray (137 mcg total) by Nasal route 2 (two) times daily. 30 mL 0    azithromycin (Z-CAIO) 250 MG tablet Take 1 tablet (250 mg total) by mouth once daily. Take 2  pills the first day then 1 pill a day for 4 days for 6 doses 6 tablet 0    benzonatate (TESSALON PERLES) 100 MG capsule Take 1 capsule (100 mg total) by mouth every 6 (six) hours as needed for Cough. 30 capsule 1    levocetirizine (XYZAL) 5 MG tablet Take 1 tablet (5 mg total) by mouth every evening. (Patient not taking: Reported on 4/19/2020) 30 tablet 11    levothyroxine (SYNTHROID) 50 MCG tablet Take 1 tablet (50 mcg total) by mouth once daily. 90 tablet 1    olmesartan (BENICAR) 20 MG tablet Take 1 tablet (20 mg total) by mouth once daily. 30 tablet 2     No current facility-administered medications for this visit.        Review of Systems   Constitutional: Negative for chills, fatigue and fever.   HENT: Positive for congestion. Negative for ear discharge, ear pain, rhinorrhea, sinus pressure, sinus pain, sneezing, sore throat and trouble swallowing.    Eyes: Negative for pain, discharge, redness and itching.   Respiratory: Positive for cough and shortness of breath (Mild). Negative for chest tightness.    Cardiovascular: Negative for chest pain.   Gastrointestinal: Negative for abdominal distention and abdominal pain.   Neurological: Negative for weakness and headaches.         Objective:        Physical Exam:   Physical Exam   Constitutional: She appears well-developed and well-nourished. No distress.   HENT:   Head: Normocephalic and atraumatic.   Neck: Normal range of motion.   Pulmonary/Chest: Effort normal. No respiratory distress.            Assessment:       1. Essential hypertension      Plan:   Essential hypertension  Comments:  Going to start her on Benicar 20 mg.  She will keep a close log at home and plan to follow-up with me in 2 weeks on virtual chat  Orders:  -     olmesartan (BENICAR) 20 MG tablet; Take 1 tablet (20 mg total) by mouth once daily.  Dispense: 30 tablet; Refill: 2      Follow up in about 2 weeks (around 5/5/2020) for BP Check-Up virtual visit.    Total time spent with patient:  20min.    Each patient to whom he or she provides medical services by telemedicine is:  (1) informed of the relationship between the physician and patient and the respective role of any other health care provider with respect to management of the patient; and (2) notified that he or she may decline to receive medical services by telemedicine and may withdraw from such care at any time.    This note was created using [employer] voice recognition software that occasionally misinterprets phrases or words.

## 2020-04-21 NOTE — PATIENT INSTRUCTIONS
High Blood Pressure, New, Begin Treatment  Your blood pressure was high enough today to start treatment with medicines. Often health care providers dont know what causes high blood pressure (hypertension). But it can be controlled with lifestyle changes and medicines. High blood pressure usually has no symptoms. But it can sometimes cause headache, dizziness, blurred vision, a rushing sound in your ears, chest pain, or shortness of breath. But even without symptoms, high blood pressure thats not treated raises your risk for heart attack and stroke. High blood pressure is a serious health risk and shouldnt be ignored.    A blood pressure reading is made up of two numbers: a higher number over a lower number. The top number is the systolic pressure. The bottom number is the diastolic pressure. A normal blood pressure is a systolic pressure of less than 120 over a diastolic pressure less than 80. You will see your blood pressure readings written together. For example, a person with a systolic pressure of 118 and a diastolic pressure of 78 will have 118/78 written in the medical record.  High blood pressure is when either the top number is 140 or higher, or the bottom number is 90 or higher. High blood pressure is diagnosed when multiple, separate readings show blood pressures above 140/90. The blood pressures between normal and high are called prehypertension.  Home care  If you have high blood pressure, you should do what is listed below to lower your blood pressure. If you are taking medicines for high blood pressure, these methods may reduce or end your need for medicines in the future.  · Begin a weight-loss program if you are overweight.  · Cut back on how much salt you get in your diet. Heres how to do this:  ¨ Dont eat foods that have a lot of salt. These include olives, pickles, smoked meats, and salted potato chips.  ¨ Dont add salt to your food at the table.  ¨ Use only small amounts of salt when  cooking.  ¨ Review food labels to track how much salt is in prepared foods.  ¨ When eating out, ask that no additional salt be added to your food order.  · Begin an exercise program. Talk with your health care provider about the type of exercise program that would be best for you. It doesn't have to be hard. Even brisk walking for 20 minutes 3 times a week is a good form of exercise.  · Dont take medicines that have heart stimulants. This includes many over-the-counter cold and sinus decongestant pills and sprays, as well as diet pills. Check the warnings about hypertension on the label. Before purchasing any over-the-counter medicines or supplements, always ask the pharmacist about the product's potential interaction with your high blood pressure and your high blood pressure medicines.  · Stimulants such as amphetamine or cocaine could be lethal for someone with high blood pressure. Never take these.  · Limit how much caffeine you get in your diet. Switch to caffeine-free products.  · Stop smoking. If you are a long-time smoker, this can be hard. Enroll in a stop-smoking program to make it more likely that you will quit for good.  · Learn how to handle stress. This is an important part of any program to lower blood pressure. Learn about relaxation methods like meditation, yoga, or biofeedback.  · If your provider prescribed medicines, take them exactly as directed. Missing doses may cause your blood pressure get out of control.  · If you miss a dose or doses, check with your healthcare provider or pharmacist about what to do.  · Consider buying an automatic blood pressure machine. Your provider can make a recommendation. You can get one of these at most pharmacies.  The American Heart Association recommends the following guidelines for home blood pressure monitoring:  · Don't smoke or drink coffee for 30 minutes before taking your blood pressure.  · Go to the bathroom before the test.  · Relax for 5 minutes before  taking the measurement.  · Sit with your back supported (don't sit on a couch or soft chair); keep your feet on the floor uncrossed. Place your arm on a solid flat surface (like a table) with the upper part of the arm at heart level. Place the middle of the cuff directly above the eye of the elbow. Check the monitor's instruction manual for an illustration.  · Take multiple readings. When you measure, take 2 to 3 readings one minute apart and record all of the results.  · Take your blood pressure at the same time every day, or as your healthcare provider recommends.  · Record the date, time, and blood pressure reading.  · Take the record with you to your next medical appointment. If your blood pressure monitor has a built-in memory, simply take the monitor with you to your next appointment.  · Call your provider if you have several high readings. Don't be frightened by a single high blood pressure reading, but if you get several high readings, check in with your healthcare provider.  · Note: When blood pressure reaches a systolic (top number) of 180 or higher OR diastolic (bottom number) of 110 or higher, seek emergency medical treatment.  Follow-up care  Because a new blood pressure medicine was started today, its important that you have your blood pressure rechecked. This is to make sure that the medicine is working and that you have no serious side effects. Keep all your follow up appointments. Write down medicine and blood pressure questions and bring them to your next appointment. If you have pressing concerns about your new medicine or your blood pressure, call your provider. Unless told otherwise, follow up with your health care provider or this facility within the next 3 days.  When to seek medical advice  Call your healthcare provider right away if any of these occur:  · Blood pressure reaches a systolic (top number) of 180 or higher, OR diastolic (bottom number) of 110 or higher, seek emergency medical  treatment.  · Chest pain or shortness of breath  · Severe headache  · Throbbing or rushing sound in the ears  · Nosebleed  · Sudden severe pain in your belly (abdomen)  · Extreme drowsiness, confusion, or fainting  · Dizziness or dizziness with a spinning sensation (vertigo)  · Weakness of an arm or leg or one side of the face  · You have problems speaking or seeing   Date Last Reviewed: 12/1/2016  © 1809-0501 [employer]. [address] [place] [address]. All rights reserved. This information is not intended as a substitute for professional medical care. Always follow your healthcare professional's instructions.

## 2020-04-22 ENCOUNTER — PATIENT MESSAGE (OUTPATIENT)
Dept: FAMILY MEDICINE | Facility: CLINIC | Age: 45
End: 2020-04-22

## 2020-04-22 NOTE — TELEPHONE ENCOUNTER
Please call pharmacy and see whats going on with her blood pressure medicine that [facility] sent yesterday. Then send to [facility] if it needs a PA or to [facility] if something else.

## 2020-05-19 ENCOUNTER — PATIENT MESSAGE (OUTPATIENT)
Dept: FAMILY MEDICINE | Facility: CLINIC | Age: 45
End: 2020-05-19

## 2020-05-19 NOTE — TELEPHONE ENCOUNTER
Pressures do look MUCH better. Actually a bit on the lower side of normal. Any dizziness, lightheadedness or blurry vision? Symptoms of low pressure?

## 2020-05-20 NOTE — TELEPHONE ENCOUNTER
Lets just continue as is for now and schedule a followup visit in 8 weeks. Virtual or in person which ever she prefers.

## 2020-07-14 ENCOUNTER — LAB VISIT (OUTPATIENT)
Dept: PRIMARY CARE CLINIC | Facility: OTHER | Age: 45
End: 2020-07-14
Attending: INTERNAL MEDICINE
Payer: OTHER GOVERNMENT

## 2020-07-14 DIAGNOSIS — Z03.818 ENCOUNTER FOR OBSERVATION FOR SUSPECTED EXPOSURE TO OTHER BIOLOGICAL AGENTS RULED OUT: ICD-10-CM

## 2020-07-14 PROCEDURE — U0003 INFECTIOUS AGENT DETECTION BY NUCLEIC ACID (DNA OR RNA); SEVERE ACUTE RESPIRATORY SYNDROME CORONAVIRUS 2 (SARS-COV-2) (CORONAVIRUS DISEASE [COVID-19]), AMPLIFIED PROBE TECHNIQUE, MAKING USE OF HIGH THROUGHPUT TECHNOLOGIES AS DESCRIBED BY CMS-2020-01-R: HCPCS

## 2020-07-18 LAB — SARS-COV-2 RNA RESP QL NAA+PROBE: NOT DETECTED

## 2020-07-20 ENCOUNTER — PATIENT MESSAGE (OUTPATIENT)
Dept: FAMILY MEDICINE | Facility: CLINIC | Age: 45
End: 2020-07-20

## 2020-07-20 DIAGNOSIS — E03.9 ACQUIRED HYPOTHYROIDISM: ICD-10-CM

## 2020-07-20 DIAGNOSIS — I10 ESSENTIAL HYPERTENSION: ICD-10-CM

## 2020-07-20 RX ORDER — LEVOTHYROXINE SODIUM 50 UG/1
50 TABLET ORAL DAILY
Qty: 90 TABLET | Refills: 1 | Status: SHIPPED | OUTPATIENT
Start: 2020-07-20 | End: 2020-07-20 | Stop reason: SDUPTHER

## 2020-07-20 RX ORDER — LEVOTHYROXINE SODIUM 50 UG/1
50 TABLET ORAL DAILY
Qty: 90 TABLET | Refills: 1 | Status: SHIPPED | OUTPATIENT
Start: 2020-07-20 | End: 2020-08-06 | Stop reason: SDUPTHER

## 2020-07-20 RX ORDER — OLMESARTAN MEDOXOMIL 20 MG/1
20 TABLET ORAL DAILY
Qty: 30 TABLET | Refills: 2 | Status: SHIPPED | OUTPATIENT
Start: 2020-07-20 | End: 2020-08-06 | Stop reason: SDUPTHER

## 2020-07-29 ENCOUNTER — TELEPHONE (OUTPATIENT)
Dept: FAMILY MEDICINE | Facility: CLINIC | Age: 45
End: 2020-07-29

## 2020-07-29 NOTE — TELEPHONE ENCOUNTER
Spoke to pt regarding her appt on 8/6. Offered virtual visit pt stated she wants to come into the office for her visit. c-19 reviewed

## 2020-08-06 ENCOUNTER — OFFICE VISIT (OUTPATIENT)
Dept: FAMILY MEDICINE | Facility: CLINIC | Age: 45
End: 2020-08-06
Payer: OTHER GOVERNMENT

## 2020-08-06 VITALS
HEART RATE: 74 BPM | SYSTOLIC BLOOD PRESSURE: 124 MMHG | HEIGHT: 68 IN | TEMPERATURE: 98 F | WEIGHT: 264 LBS | DIASTOLIC BLOOD PRESSURE: 88 MMHG | BODY MASS INDEX: 40.01 KG/M2

## 2020-08-06 DIAGNOSIS — I10 ESSENTIAL HYPERTENSION: ICD-10-CM

## 2020-08-06 DIAGNOSIS — E03.9 ACQUIRED HYPOTHYROIDISM: ICD-10-CM

## 2020-08-06 PROCEDURE — 99213 OFFICE O/P EST LOW 20 MIN: CPT | Mod: S$GLB,,, | Performed by: PHYSICIAN ASSISTANT

## 2020-08-06 PROCEDURE — 99213 PR OFFICE/OUTPT VISIT, EST, LEVL III, 20-29 MIN: ICD-10-PCS | Mod: S$GLB,,, | Performed by: PHYSICIAN ASSISTANT

## 2020-08-06 RX ORDER — OLMESARTAN MEDOXOMIL 20 MG/1
20 TABLET ORAL DAILY
Qty: 90 TABLET | Refills: 1 | Status: SHIPPED | OUTPATIENT
Start: 2020-08-06 | End: 2021-01-13 | Stop reason: SDUPTHER

## 2020-08-06 RX ORDER — LEVOTHYROXINE SODIUM 50 UG/1
50 TABLET ORAL DAILY
Qty: 90 TABLET | Refills: 1 | Status: SHIPPED | OUTPATIENT
Start: 2020-08-06 | End: 2021-01-13 | Stop reason: SDUPTHER

## 2020-08-06 NOTE — PROGRESS NOTES
SUBJECTIVE:    Patient ID: Tori Escalona is a 44 y.o. female.    Chief Complaint: Follow-up (6 month//brought bottles tb )    43 yo female presents for checkup and refills. She reports that she has been doing pretty well overall. A few little scares with children testing positive for COVID-19. She quarantined with him. Ultimately tested negative. She is down about 20 lbs since the beginning of the year.     Follow-up  Pertinent negatives include no abdominal pain, arthralgias, chest pain, chills, congestion, coughing, fatigue, fever, headaches or weakness.       Lab Visit on 07/14/2020   Component Date Value Ref Range Status    SARS-CoV2 (COVID-19) Qualitative P* 07/14/2020 Not Detected  Not Detected Final   Clinical Support on 04/19/2020   Component Date Value Ref Range Status    SARS-CoV2 (COVID-19) Qualitative P* 04/19/2020 Not Detected  Not Detected Final       Past Medical History:   Diagnosis Date    Thyroid disease      History reviewed. No pertinent surgical history.  Family History   Problem Relation Age of Onset    Osteoporosis Mother     Hypertension Father        Marital Status:   Alcohol History:  reports no history of alcohol use.  Tobacco History:  reports that she has never smoked. She has never used smokeless tobacco.  Drug History:  reports no history of drug use.    Review of patient's allergies indicates:  No Known Allergies    Current Outpatient Medications:     levocetirizine (XYZAL) 5 MG tablet, Take 1 tablet (5 mg total) by mouth every evening., Disp: 30 tablet, Rfl: 11    levothyroxine (SYNTHROID) 50 MCG tablet, Take 1 tablet (50 mcg total) by mouth once daily., Disp: 90 tablet, Rfl: 1    olmesartan (BENICAR) 20 MG tablet, Take 1 tablet (20 mg total) by mouth once daily., Disp: 90 tablet, Rfl: 1    Review of Systems   Constitutional: Negative for appetite change, chills, fatigue, fever and unexpected weight change.   HENT: Negative for congestion.    Respiratory: Negative  "for cough, chest tightness and shortness of breath.    Cardiovascular: Negative for chest pain and palpitations.   Gastrointestinal: Negative for abdominal distention and abdominal pain.   Endocrine: Negative for cold intolerance and heat intolerance.   Genitourinary: Negative for difficulty urinating and dysuria.   Musculoskeletal: Negative for arthralgias and back pain.   Neurological: Negative for dizziness, weakness and headaches.          Objective:      Vitals:    08/06/20 0856   BP: 124/88   Pulse: 74   Temp: 98 °F (36.7 °C)   Weight: 119.7 kg (264 lb)   Height: 5' 8" (1.727 m)     Physical Exam  Constitutional:       General: She is not in acute distress.     Appearance: She is well-developed.   HENT:      Head: Normocephalic and atraumatic.   Eyes:      Conjunctiva/sclera: Conjunctivae normal.      Pupils: Pupils are equal, round, and reactive to light.   Neck:      Musculoskeletal: Normal range of motion and neck supple.      Thyroid: No thyromegaly.   Cardiovascular:      Rate and Rhythm: Normal rate and regular rhythm.      Heart sounds: Normal heart sounds.   Pulmonary:      Effort: Pulmonary effort is normal.      Breath sounds: Normal breath sounds.   Abdominal:      General: Bowel sounds are normal. There is no distension.      Palpations: Abdomen is soft.      Tenderness: There is no abdominal tenderness.   Musculoskeletal: Normal range of motion.   Skin:     General: Skin is warm and dry.      Findings: No erythema.   Neurological:      Mental Status: She is alert and oriented to person, place, and time.      Cranial Nerves: No cranial nerve deficit.           Assessment:       1. Acquired hypothyroidism    2. Essential hypertension         Plan:       Acquired hypothyroidism  Comments:  TSH at goal. refills of current dose. continue as is.  Orders:  -     levothyroxine (SYNTHROID) 50 MCG tablet; Take 1 tablet (50 mcg total) by mouth once daily.  Dispense: 90 tablet; Refill: 1    Essential " hypertension  Comments:  at goal, continue as is.  Orders:  -     olmesartan (BENICAR) 20 MG tablet; Take 1 tablet (20 mg total) by mouth once daily.  Dispense: 90 tablet; Refill: 1      Follow up in about 6 months (around 2/6/2021) for Annual Physical.        8/6/2020 Rachid Nevarez PA-C

## 2020-08-06 NOTE — PATIENT INSTRUCTIONS
Established High Blood Pressure    High blood pressure (hypertension) is a chronic disease. Often, healthcare providers dont know what causes it. But it can be caused by certain health conditions and medicines.  If you have high blood pressure, you may not have any symptoms. If you do have symptoms, they may include headache, dizziness, changes in your vision, chest pain, and shortness of breath. But even without symptoms, high blood pressure thats not treated raises your risk for heart attack and stroke. High blood pressure is a serious health risk and shouldnt be ignored.  A blood pressure reading is made up of two numbers: a higher number over a lower number. The top number is the systolic pressure. The bottom number is the diastolic pressure. A normal blood pressure is a systolic pressure of  less than 120 over a diastolic pressure of less than 80. You will see your blood pressure readings written together. For example, a person with a systolic pressure of 188 and a diastolic pressure of 78 will have 118/78 written in the medical record.  High blood pressure is when either the top number is 140 or higher, or the bottom number is 90 or higher. This must be the result when taking your blood pressure a number of times. The blood pressures between normal and high are called prehypertension.  Home care  If you have high blood pressure, you should do what is listed below to lower your blood pressure. If you are taking medicines for high blood pressure, these methods may reduce or end your need for medicines in the future.  · Begin a weight-loss program if you are overweight.  · Cut back on how much salt you get in your diet. Heres how to do this:  ¨ Dont eat foods that have a lot of salt. These include olives, pickles, smoked meats, and salted potato chips.  ¨ Dont add salt to your food at the table.  ¨ Use only small amounts of salt when cooking.  · Start an exercise program. Talk with your healthcare  provider about the type of exercise program that would be best for you. It doesn't have to be hard. Even brisk walking for 20 minutes 3 times a week is a good form of exercise.  · Dont take medicines that stimulate the heart. This includes many over-the-counter cold and sinus decongestant pills and sprays, as well as diet pills. Check the warnings about hypertension on the label. Before buying any over-the-counter medicines or supplements, always ask the pharmacist about the product's potential interaction with your high blood pressure and your high blood pressure medicines.  · Stimulants such as amphetamine or cocaine could be deadly for someone with high blood pressure. Never take these.  · Limit how much caffeine you get in your diet. Switch to caffeine-free products.  · Stop smoking. If you are a long-time smoker, this can be hard. Talk to your healthcare provider about medicines and nicotine replacement options to help you. Also, enroll in a stop-smoking program to make it more likely that you will quit for good.  · Learn how to handle stress. This is an important part of any program to lower blood pressure. Learn about relaxation methods like meditation, yoga, or biofeedback.  · If your provider prescribed medicines, take them exactly as directed. Missing doses may cause your blood pressure get out of control.  · If you miss a dose or doses, check with your healthcare provider or pharmacist about what to do.  · Consider buying an automatic blood pressure machine. Ask your provider for a recommendation. You can get one of these at most pharmacies.     The American Heart Association recommends the following guidelines for home blood pressure monitoring:  · Don't smoke or drink coffee for 30 minutes before taking your blood pressure.  · Go to the bathroom before the test.  · Relax for 5 minutes before taking the measurement.  · Sit with your back supported (don't sit on a couch or soft chair); keep your feet on  the floor uncrossed. Place your arm on a solid flat surface (like a table) with the upper part of the arm at heart level. Place the middle of the cuff directly above the eye of the elbow. Check the monitor's instruction manual for an illustration.  · Take multiple readings. When you measure, take 2 to 3 readings one minute apart and record all of the results.  · Take your blood pressure at the same time every day, or as your healthcare provider recommends.  · Record the date, time, and blood pressure reading.  · Take the record with you to your next medical appointment. If your blood pressure monitor has a built-in memory, simply take the monitor with you to your next appointment.  · Call your provider if you have several high readings. Don't be frightened by a single high blood pressure reading, but if you get several high readings, check in with your healthcare provider.  · Note: When blood pressure reaches a systolic (top number) of 180 or higher OR diastolic (bottom number) of 110 or higher, seek emergency medical treatment.  Follow-up care  You will need to see your healthcare provider regularly. This is to check your blood pressure and to make changes to your medicines. Make a follow-up appointment as directed. Bring the record of your home blood pressure readings to the appointment.  When to seek medical advice  Call your healthcare provider right away if any of these occur:  · Blood pressure reaches a systolic (upper number) of 180 or higher OR a diastolic (bottom number) of 110 or higher  · Chest pain or shortness of breath  · Severe headache  · Throbbing or rushing sound in the ears  · Nosebleed  · Sudden severe pain in your belly (abdomen)  · Extreme drowsiness, confusion, or fainting  · Dizziness or spinning sensation (vertigo)  · Weakness of an arm or leg or one side of the face  · You have problems speaking or seeing   Date Last Reviewed: 12/1/2016  © 8268-8587 [employer]. [address] [place] [address]  [place] [address]. All rights reserved. This information is not intended as a substitute for professional medical care. Always follow your healthcare professional's instructions.

## 2020-09-11 ENCOUNTER — PATIENT MESSAGE (OUTPATIENT)
Dept: FAMILY MEDICINE | Facility: CLINIC | Age: 45
End: 2020-09-11

## 2020-09-11 DIAGNOSIS — Z30.432 ENCOUNTER FOR IUD REMOVAL: Primary | ICD-10-CM

## 2020-09-16 ENCOUNTER — PATIENT MESSAGE (OUTPATIENT)
Dept: FAMILY MEDICINE | Facility: CLINIC | Age: 45
End: 2020-09-16

## 2020-12-18 ENCOUNTER — OFFICE VISIT (OUTPATIENT)
Dept: FAMILY MEDICINE | Facility: CLINIC | Age: 45
End: 2020-12-18
Payer: OTHER GOVERNMENT

## 2020-12-18 VITALS
DIASTOLIC BLOOD PRESSURE: 88 MMHG | WEIGHT: 275 LBS | BODY MASS INDEX: 41.68 KG/M2 | HEIGHT: 68 IN | SYSTOLIC BLOOD PRESSURE: 118 MMHG | HEART RATE: 72 BPM

## 2020-12-18 DIAGNOSIS — E03.9 ACQUIRED HYPOTHYROIDISM: Primary | ICD-10-CM

## 2020-12-18 PROCEDURE — 99213 PR OFFICE/OUTPT VISIT, EST, LEVL III, 20-29 MIN: ICD-10-PCS | Mod: S$GLB,,, | Performed by: PHYSICIAN ASSISTANT

## 2020-12-18 PROCEDURE — 99213 OFFICE O/P EST LOW 20 MIN: CPT | Mod: S$GLB,,, | Performed by: PHYSICIAN ASSISTANT

## 2020-12-18 NOTE — PROGRESS NOTES
SUBJECTIVE:    Patient ID: Tori Escalona is a 45 y.o. female.    Chief Complaint: Shortness of Breath (brought bottles, declines refills, (Kenzie Bermudez)mammo schedule for 1/2021 @ [facility])    46 yo wf presents for regular checkup and refills. Reports that she has been doing pretty well. Has had numerous exposures to COVID given her kids in school. Seems to be having some URI sxs and slight sinuses. Using mucinex for her sinuses and chest tightness. Negative for COVID-19. BP does look stable, thyroid has been well controlled as well. But with slight fatigue and feeling run down interested in checking again.      Lab Visit on 07/14/2020   Component Date Value Ref Range Status    SARS-CoV2 (COVID-19) Qualitative P* 07/14/2020 Not Detected  Not Detected Final       Past Medical History:   Diagnosis Date    Thyroid disease      History reviewed. No pertinent surgical history.  Family History   Problem Relation Age of Onset    Osteoporosis Mother     Hypertension Father        Marital Status:   Alcohol History:  reports no history of alcohol use.  Tobacco History:  reports that she has never smoked. She has never used smokeless tobacco.  Drug History:  reports no history of drug use.    Review of patient's allergies indicates:  No Known Allergies    Current Outpatient Medications:     levocetirizine (XYZAL) 5 MG tablet, Take 1 tablet (5 mg total) by mouth every evening., Disp: 30 tablet, Rfl: 11    olmesartan (BENICAR) 20 MG tablet, Take 1 tablet (20 mg total) by mouth once daily., Disp: 90 tablet, Rfl: 1    levothyroxine (SYNTHROID) 50 MCG tablet, Take 1 tablet (50 mcg total) by mouth once daily. (Patient not taking: Reported on 12/18/2020), Disp: 90 tablet, Rfl: 1    Review of Systems   Constitutional: Negative for appetite change, chills, fatigue, fever and unexpected weight change.   HENT: Negative for congestion.    Respiratory: Negative for cough, chest tightness and shortness of breath.   "  Cardiovascular: Negative for chest pain and palpitations.   Gastrointestinal: Negative for abdominal distention and abdominal pain.   Endocrine: Negative for cold intolerance and heat intolerance.   Genitourinary: Negative for difficulty urinating and dysuria.   Musculoskeletal: Negative for arthralgias and back pain.   Neurological: Negative for dizziness, weakness and headaches.          Objective:      Vitals:    12/18/20 0848   BP: 118/88   Pulse: 72   Weight: 124.7 kg (275 lb)   Height: 5' 8" (1.727 m)     Physical Exam  Constitutional:       General: She is not in acute distress.     Appearance: She is well-developed.   HENT:      Head: Normocephalic and atraumatic.   Eyes:      Conjunctiva/sclera: Conjunctivae normal.      Pupils: Pupils are equal, round, and reactive to light.   Neck:      Musculoskeletal: Normal range of motion and neck supple.      Thyroid: No thyromegaly.   Cardiovascular:      Rate and Rhythm: Normal rate and regular rhythm.      Heart sounds: Normal heart sounds.   Pulmonary:      Effort: Pulmonary effort is normal.      Breath sounds: Normal breath sounds.   Abdominal:      General: Bowel sounds are normal. There is no distension.      Palpations: Abdomen is soft.      Tenderness: There is no abdominal tenderness.   Musculoskeletal: Normal range of motion.   Skin:     General: Skin is warm and dry.      Findings: No erythema.   Neurological:      Mental Status: She is alert and oriented to person, place, and time.      Cranial Nerves: No cranial nerve deficit.           Assessment:       1. Acquired hypothyroidism         Plan:       Acquired hypothyroidism  Comments:  will check labs now for ongoing pt care.  Orders:  -     CBC Auto Differential; Future; Expected date: 12/18/2020  -     Comprehensive Metabolic Panel; Future; Expected date: 12/18/2020  -     TSH w/reflex to FT4; Future; Expected date: 12/18/2020      Follow up if symptoms worsen or fail to improve, for as " scheduled.        12/18/2020 Rachid Nevarez PA-C

## 2020-12-18 NOTE — PATIENT INSTRUCTIONS
Hypothyroidism       You have hypothyroidism. This means your thyroid gland is not making enough thyroid hormone. This hormone is vital to body growth and metabolism. If you dont make enough, many body processes slow down. This can cause symptoms throughout the body. Hypothyroidism can range from mild to severe. The most severe form is called myxedema.  There are a number of causes of hypothyroidism. A common cause is Hashimotos disease. This disease causes the bodys own immune system to attack the thyroid gland. When you have certain treatments, such as surgery to remove the thyroid gland, this can also cause hypothyroidism.  Symptoms of hypothyroidism can include:  · Fatigue  · Trouble concentrating or thinking clearly; forgetfulness  · Dry skin  · Hair loss  · Weight gain  · Low tolerance to cold  · Constipation  · Depression  · Personality changes  · Tingling or prickling of the hands or feet  · Heavy, absent, or irregular periods (women only)  Older adults may sometimes have other symptoms. These can include:  · Muscle aches and weakness  · Confusion  · Incontinence (unable to control urine or stool)  · Trouble moving around  · Falling  Treatment for hypothyroidism involves taking thyroid hormone pills daily. These pills replace the hormone your thyroid doesnt make. You will likely need to take a daily pill for the rest of your life. Tips for taking this medicine are given below.  Home care  Tips for taking your medicine  · Take your thyroid hormone pills as prescribed by your healthcare provider. This is most often 1 pill a day on an empty stomach. Use a pillbox labeled with the days of the week. This will help you remember to take your pill each day.  · Dont take products that contain iron and calcium or antacids within 4 hours of taking your thyroid hormone pills.  · Dont take other medicines with your thyroid hormone pill without checking with your provider first.  · Tell your provider if you have  any side effects from your medicines that bother you.  · Never change the dosage or stop taking your thyroid pills without talking to your provider first.  General care  · Always talk with your provider before trying other medicines or treatments for your thyroid problem.  · If you see other healthcare providers, be sure to let them know about your thyroid problem.  Follow-up care  See your healthcare provider for checkups as advised. You may need regular tests to check the level of thyroid hormone in your blood.  When to seek medical advice  Call your healthcare provider right away if any of these occur:  · New symptoms develop  · Symptoms return, continue, or worsen even after treatment  · Extreme fatigue  · Puffy hands, face, or feet  · Fast or irregular heartbeat  · Confusion  Call 911  Call 911 right away if any of these occur:  · Fainting  · Chest pain  · Shortness of breath or trouble breathing  Date Last Reviewed: 8/24/2015  © 0004-7109 [employer]. [address] [place] [address], [place] [address]. All rights reserved. This information is not intended as a substitute for professional medical care. Always follow your healthcare professional's instructions.

## 2020-12-19 LAB
ALBUMIN SERPL-MCNC: 4.3 G/DL (ref 3.6–5.1)
ALBUMIN/GLOB SERPL: 1.4 (CALC) (ref 1–2.5)
ALP SERPL-CCNC: 58 U/L (ref 31–125)
ALT SERPL-CCNC: 15 U/L (ref 6–29)
AST SERPL-CCNC: 15 U/L (ref 10–30)
BASOPHILS # BLD AUTO: 31 CELLS/UL (ref 0–200)
BASOPHILS NFR BLD AUTO: 0.5 %
BILIRUB SERPL-MCNC: 0.4 MG/DL (ref 0.2–1.2)
BUN SERPL-MCNC: 16 MG/DL (ref 7–25)
BUN/CREAT SERPL: NORMAL (CALC) (ref 6–22)
CALCIUM SERPL-MCNC: 9.1 MG/DL (ref 8.6–10.2)
CHLORIDE SERPL-SCNC: 104 MMOL/L (ref 98–110)
CO2 SERPL-SCNC: 24 MMOL/L (ref 20–32)
CREAT SERPL-MCNC: 0.72 MG/DL (ref 0.5–1.1)
EOSINOPHIL # BLD AUTO: 161 CELLS/UL (ref 15–500)
EOSINOPHIL NFR BLD AUTO: 2.6 %
ERYTHROCYTE [DISTWIDTH] IN BLOOD BY AUTOMATED COUNT: 11.8 % (ref 11–15)
GFRSERPLBLD MDRD-ARVRAT: 102 ML/MIN/1.73M2
GLOBULIN SER CALC-MCNC: 3 G/DL (CALC) (ref 1.9–3.7)
GLUCOSE SERPL-MCNC: 84 MG/DL (ref 65–99)
HCT VFR BLD AUTO: 40.8 % (ref 35–45)
HGB BLD-MCNC: 13.4 G/DL (ref 11.7–15.5)
LYMPHOCYTES # BLD AUTO: 1562 CELLS/UL (ref 850–3900)
LYMPHOCYTES NFR BLD AUTO: 25.2 %
MCH RBC QN AUTO: 29.3 PG (ref 27–33)
MCHC RBC AUTO-ENTMCNC: 32.8 G/DL (ref 32–36)
MCV RBC AUTO: 89.3 FL (ref 80–100)
MONOCYTES # BLD AUTO: 403 CELLS/UL (ref 200–950)
MONOCYTES NFR BLD AUTO: 6.5 %
NEUTROPHILS # BLD AUTO: 4042 CELLS/UL (ref 1500–7800)
NEUTROPHILS NFR BLD AUTO: 65.2 %
PLATELET # BLD AUTO: 370 THOUSAND/UL (ref 140–400)
PMV BLD REES-ECKER: 10.7 FL (ref 7.5–12.5)
POTASSIUM SERPL-SCNC: 4.6 MMOL/L (ref 3.5–5.3)
PROT SERPL-MCNC: 7.3 G/DL (ref 6.1–8.1)
RBC # BLD AUTO: 4.57 MILLION/UL (ref 3.8–5.1)
SODIUM SERPL-SCNC: 138 MMOL/L (ref 135–146)
TSH SERPL-ACNC: 1.42 MIU/L
WBC # BLD AUTO: 6.2 THOUSAND/UL (ref 3.8–10.8)

## 2021-01-13 DIAGNOSIS — I10 ESSENTIAL HYPERTENSION: ICD-10-CM

## 2021-01-13 DIAGNOSIS — E03.9 ACQUIRED HYPOTHYROIDISM: ICD-10-CM

## 2021-01-13 RX ORDER — OLMESARTAN MEDOXOMIL 20 MG/1
20 TABLET ORAL DAILY
Qty: 90 TABLET | Refills: 1 | Status: SHIPPED | OUTPATIENT
Start: 2021-01-13 | End: 2021-01-26 | Stop reason: SDUPTHER

## 2021-01-13 RX ORDER — LEVOTHYROXINE SODIUM 50 UG/1
50 TABLET ORAL DAILY
Qty: 90 TABLET | Refills: 1 | Status: SHIPPED | OUTPATIENT
Start: 2021-01-13 | End: 2021-01-26 | Stop reason: SDUPTHER

## 2021-01-21 ENCOUNTER — TELEPHONE (OUTPATIENT)
Dept: FAMILY MEDICINE | Facility: CLINIC | Age: 46
End: 2021-01-21

## 2021-01-26 DIAGNOSIS — E03.9 ACQUIRED HYPOTHYROIDISM: ICD-10-CM

## 2021-01-26 DIAGNOSIS — I10 ESSENTIAL HYPERTENSION: ICD-10-CM

## 2021-01-26 RX ORDER — LEVOTHYROXINE SODIUM 50 UG/1
50 TABLET ORAL DAILY
Qty: 90 TABLET | Refills: 1 | Status: SHIPPED | OUTPATIENT
Start: 2021-01-26 | End: 2021-03-23 | Stop reason: SDUPTHER

## 2021-01-26 RX ORDER — OLMESARTAN MEDOXOMIL 20 MG/1
20 TABLET ORAL DAILY
Qty: 90 TABLET | Refills: 1 | Status: SHIPPED | OUTPATIENT
Start: 2021-01-26 | End: 2021-03-23 | Stop reason: SDUPTHER

## 2021-03-05 ENCOUNTER — TELEPHONE (OUTPATIENT)
Dept: FAMILY MEDICINE | Facility: CLINIC | Age: 46
End: 2021-03-05

## 2021-03-23 ENCOUNTER — OFFICE VISIT (OUTPATIENT)
Dept: FAMILY MEDICINE | Facility: CLINIC | Age: 46
End: 2021-03-23
Payer: OTHER GOVERNMENT

## 2021-03-23 VITALS
HEART RATE: 64 BPM | SYSTOLIC BLOOD PRESSURE: 120 MMHG | HEIGHT: 68 IN | DIASTOLIC BLOOD PRESSURE: 78 MMHG | WEIGHT: 275 LBS | BODY MASS INDEX: 41.68 KG/M2

## 2021-03-23 DIAGNOSIS — E03.9 ACQUIRED HYPOTHYROIDISM: ICD-10-CM

## 2021-03-23 DIAGNOSIS — I10 ESSENTIAL HYPERTENSION: Primary | ICD-10-CM

## 2021-03-23 PROCEDURE — 99213 PR OFFICE/OUTPT VISIT, EST, LEVL III, 20-29 MIN: ICD-10-PCS | Mod: S$GLB,,, | Performed by: PHYSICIAN ASSISTANT

## 2021-03-23 PROCEDURE — 99213 OFFICE O/P EST LOW 20 MIN: CPT | Mod: S$GLB,,, | Performed by: PHYSICIAN ASSISTANT

## 2021-03-23 RX ORDER — LEVOTHYROXINE SODIUM 50 UG/1
50 TABLET ORAL DAILY
Qty: 90 TABLET | Refills: 1 | Status: SHIPPED | OUTPATIENT
Start: 2021-03-23 | End: 2021-12-09 | Stop reason: SDUPTHER

## 2021-03-23 RX ORDER — OLMESARTAN MEDOXOMIL 20 MG/1
20 TABLET ORAL DAILY
Qty: 90 TABLET | Refills: 1 | Status: SHIPPED | OUTPATIENT
Start: 2021-03-23 | End: 2021-09-24

## 2021-04-20 ENCOUNTER — PATIENT MESSAGE (OUTPATIENT)
Dept: FAMILY MEDICINE | Facility: CLINIC | Age: 46
End: 2021-04-20

## 2021-04-20 DIAGNOSIS — Z13.5 SCREENING FOR EYE CONDITION: Primary | ICD-10-CM

## 2021-05-06 ENCOUNTER — PATIENT MESSAGE (OUTPATIENT)
Dept: FAMILY MEDICINE | Facility: CLINIC | Age: 46
End: 2021-05-06

## 2021-05-17 ENCOUNTER — PATIENT MESSAGE (OUTPATIENT)
Dept: FAMILY MEDICINE | Facility: CLINIC | Age: 46
End: 2021-05-17

## 2021-05-17 DIAGNOSIS — Z71.3 ENCOUNTER FOR NUTRITION EVALUATION PRIOR TO BARIATRIC SURGERY: Primary | ICD-10-CM

## 2021-05-18 ENCOUNTER — PATIENT MESSAGE (OUTPATIENT)
Dept: FAMILY MEDICINE | Facility: CLINIC | Age: 46
End: 2021-05-18

## 2021-05-18 DIAGNOSIS — Z71.3 ENCOUNTER FOR NUTRITION EVALUATION PRIOR TO BARIATRIC SURGERY: Primary | ICD-10-CM

## 2021-07-29 ENCOUNTER — TELEPHONE (OUTPATIENT)
Dept: FAMILY MEDICINE | Facility: CLINIC | Age: 46
End: 2021-07-29

## 2021-07-29 DIAGNOSIS — E66.01 MORBID OBESITY: Primary | ICD-10-CM

## 2021-07-30 ENCOUNTER — PATIENT MESSAGE (OUTPATIENT)
Dept: FAMILY MEDICINE | Facility: CLINIC | Age: 46
End: 2021-07-30

## 2021-08-03 ENCOUNTER — PATIENT MESSAGE (OUTPATIENT)
Dept: FAMILY MEDICINE | Facility: CLINIC | Age: 46
End: 2021-08-03

## 2021-09-19 ENCOUNTER — PATIENT MESSAGE (OUTPATIENT)
Dept: FAMILY MEDICINE | Facility: CLINIC | Age: 46
End: 2021-09-19

## 2021-09-20 ENCOUNTER — PATIENT MESSAGE (OUTPATIENT)
Dept: FAMILY MEDICINE | Facility: CLINIC | Age: 46
End: 2021-09-20

## 2021-09-22 ENCOUNTER — TELEPHONE (OUTPATIENT)
Dept: FAMILY MEDICINE | Facility: CLINIC | Age: 46
End: 2021-09-22

## 2021-09-24 ENCOUNTER — OFFICE VISIT (OUTPATIENT)
Dept: FAMILY MEDICINE | Facility: CLINIC | Age: 46
End: 2021-09-24
Payer: OTHER GOVERNMENT

## 2021-09-24 VITALS
HEART RATE: 80 BPM | BODY MASS INDEX: 38.7 KG/M2 | HEIGHT: 68 IN | SYSTOLIC BLOOD PRESSURE: 118 MMHG | DIASTOLIC BLOOD PRESSURE: 80 MMHG | WEIGHT: 255.38 LBS

## 2021-09-24 DIAGNOSIS — R42 DIZZINESS: Primary | ICD-10-CM

## 2021-09-24 DIAGNOSIS — I10 ESSENTIAL HYPERTENSION: ICD-10-CM

## 2021-09-24 PROCEDURE — 99213 PR OFFICE/OUTPT VISIT, EST, LEVL III, 20-29 MIN: ICD-10-PCS | Mod: S$GLB,,, | Performed by: PHYSICIAN ASSISTANT

## 2021-09-24 PROCEDURE — 99213 OFFICE O/P EST LOW 20 MIN: CPT | Mod: S$GLB,,, | Performed by: PHYSICIAN ASSISTANT

## 2021-09-24 RX ORDER — PANTOPRAZOLE SODIUM 40 MG/1
40 TABLET, DELAYED RELEASE ORAL DAILY
COMMUNITY
Start: 2021-09-09

## 2021-09-24 RX ORDER — HYOSCYAMINE SULFATE 0.12 MG/1
TABLET SUBLINGUAL
COMMUNITY
Start: 2021-09-09

## 2021-11-29 ENCOUNTER — TELEPHONE (OUTPATIENT)
Dept: FAMILY MEDICINE | Facility: CLINIC | Age: 46
End: 2021-11-29
Payer: OTHER GOVERNMENT

## 2021-11-29 DIAGNOSIS — Z79.899 ENCOUNTER FOR LONG-TERM (CURRENT) USE OF OTHER MEDICATIONS: Primary | ICD-10-CM

## 2021-11-29 DIAGNOSIS — Z00.00 ROUTINE GENERAL MEDICAL EXAMINATION AT A HEALTH CARE FACILITY: ICD-10-CM

## 2021-12-02 LAB
ALBUMIN SERPL-MCNC: 4.2 G/DL (ref 3.6–5.1)
ALBUMIN/GLOB SERPL: 1.6 (CALC) (ref 1–2.5)
ALP SERPL-CCNC: 55 U/L (ref 31–125)
ALT SERPL-CCNC: 11 U/L (ref 6–29)
AST SERPL-CCNC: 14 U/L (ref 10–35)
BASOPHILS # BLD AUTO: 31 CELLS/UL (ref 0–200)
BASOPHILS NFR BLD AUTO: 0.6 %
BILIRUB SERPL-MCNC: 0.5 MG/DL (ref 0.2–1.2)
BUN SERPL-MCNC: 11 MG/DL (ref 7–25)
BUN/CREAT SERPL: NORMAL (CALC) (ref 6–22)
CALCIUM SERPL-MCNC: 9.2 MG/DL (ref 8.6–10.2)
CHLORIDE SERPL-SCNC: 105 MMOL/L (ref 98–110)
CHOLEST SERPL-MCNC: 139 MG/DL
CHOLEST/HDLC SERPL: 3.1 (CALC)
CO2 SERPL-SCNC: 26 MMOL/L (ref 20–32)
CREAT SERPL-MCNC: 0.66 MG/DL (ref 0.5–1.1)
EOSINOPHIL # BLD AUTO: 99 CELLS/UL (ref 15–500)
EOSINOPHIL NFR BLD AUTO: 1.9 %
ERYTHROCYTE [DISTWIDTH] IN BLOOD BY AUTOMATED COUNT: 12.7 % (ref 11–15)
GLOBULIN SER CALC-MCNC: 2.6 G/DL (CALC) (ref 1.9–3.7)
GLUCOSE SERPL-MCNC: 78 MG/DL (ref 65–99)
HCT VFR BLD AUTO: 38.8 % (ref 35–45)
HDLC SERPL-MCNC: 45 MG/DL
HGB BLD-MCNC: 12.5 G/DL (ref 11.7–15.5)
LDLC SERPL CALC-MCNC: 78 MG/DL (CALC)
LYMPHOCYTES # BLD AUTO: 1534 CELLS/UL (ref 850–3900)
LYMPHOCYTES NFR BLD AUTO: 29.5 %
MCH RBC QN AUTO: 29.3 PG (ref 27–33)
MCHC RBC AUTO-ENTMCNC: 32.2 G/DL (ref 32–36)
MCV RBC AUTO: 91.1 FL (ref 80–100)
MONOCYTES # BLD AUTO: 281 CELLS/UL (ref 200–950)
MONOCYTES NFR BLD AUTO: 5.4 %
NEUTROPHILS # BLD AUTO: 3255 CELLS/UL (ref 1500–7800)
NEUTROPHILS NFR BLD AUTO: 62.6 %
NONHDLC SERPL-MCNC: 94 MG/DL (CALC)
PLATELET # BLD AUTO: 295 THOUSAND/UL (ref 140–400)
PMV BLD REES-ECKER: 10.8 FL (ref 7.5–12.5)
POTASSIUM SERPL-SCNC: 3.6 MMOL/L (ref 3.5–5.3)
PROT SERPL-MCNC: 6.8 G/DL (ref 6.1–8.1)
RBC # BLD AUTO: 4.26 MILLION/UL (ref 3.8–5.1)
SODIUM SERPL-SCNC: 140 MMOL/L (ref 135–146)
TRIGL SERPL-MCNC: 75 MG/DL
TSH SERPL-ACNC: 1.96 MIU/L
WBC # BLD AUTO: 5.2 THOUSAND/UL (ref 3.8–10.8)

## 2021-12-04 LAB
APPEARANCE UR: ABNORMAL
BACTERIA #/AREA URNS HPF: ABNORMAL /HPF
BACTERIA UR CULT: ABNORMAL
BACTERIA UR CULT: ABNORMAL
BILIRUB UR QL STRIP: NEGATIVE
CAOX CRY #/AREA URNS HPF: ABNORMAL /HPF
COLOR UR: YELLOW
GLUCOSE UR QL STRIP: NEGATIVE
HGB UR QL STRIP: NEGATIVE
HYALINE CASTS #/AREA URNS LPF: ABNORMAL /LPF
KETONES UR QL STRIP: ABNORMAL
LEUKOCYTE ESTERASE UR QL STRIP: ABNORMAL
NITRITE UR QL STRIP: NEGATIVE
PH UR STRIP: 6 [PH] (ref 5–8)
PROT UR QL STRIP: NEGATIVE
RBC #/AREA URNS HPF: ABNORMAL /HPF
SERVICE CMNT-IMP: ABNORMAL
SP GR UR STRIP: 1.02 (ref 1–1.03)
SQUAMOUS #/AREA URNS HPF: ABNORMAL /HPF
WBC #/AREA URNS HPF: ABNORMAL /HPF

## 2021-12-06 ENCOUNTER — TELEPHONE (OUTPATIENT)
Dept: FAMILY MEDICINE | Facility: CLINIC | Age: 46
End: 2021-12-06
Payer: OTHER GOVERNMENT

## 2021-12-09 ENCOUNTER — OFFICE VISIT (OUTPATIENT)
Dept: FAMILY MEDICINE | Facility: CLINIC | Age: 46
End: 2021-12-09
Payer: OTHER GOVERNMENT

## 2021-12-09 VITALS
HEART RATE: 82 BPM | WEIGHT: 230.81 LBS | HEIGHT: 68 IN | DIASTOLIC BLOOD PRESSURE: 80 MMHG | BODY MASS INDEX: 34.98 KG/M2 | SYSTOLIC BLOOD PRESSURE: 120 MMHG

## 2021-12-09 DIAGNOSIS — I10 ESSENTIAL HYPERTENSION: ICD-10-CM

## 2021-12-09 DIAGNOSIS — E03.9 ACQUIRED HYPOTHYROIDISM: Primary | ICD-10-CM

## 2021-12-09 DIAGNOSIS — Z98.84 HISTORY OF BARIATRIC SURGERY: ICD-10-CM

## 2021-12-09 PROCEDURE — 99214 PR OFFICE/OUTPT VISIT, EST, LEVL IV, 30-39 MIN: ICD-10-PCS | Mod: S$GLB,,, | Performed by: PHYSICIAN ASSISTANT

## 2021-12-09 PROCEDURE — 99214 OFFICE O/P EST MOD 30 MIN: CPT | Mod: S$GLB,,, | Performed by: PHYSICIAN ASSISTANT

## 2021-12-09 RX ORDER — LEVOTHYROXINE SODIUM 50 UG/1
50 TABLET ORAL DAILY
Qty: 90 TABLET | Refills: 1 | Status: SHIPPED | OUTPATIENT
Start: 2021-12-09 | End: 2022-07-14 | Stop reason: SDUPTHER

## 2022-01-13 ENCOUNTER — TELEPHONE (OUTPATIENT)
Dept: FAMILY MEDICINE | Facility: CLINIC | Age: 47
End: 2022-01-13
Payer: OTHER GOVERNMENT

## 2022-01-14 ENCOUNTER — PATIENT MESSAGE (OUTPATIENT)
Dept: FAMILY MEDICINE | Facility: CLINIC | Age: 47
End: 2022-01-14
Payer: OTHER GOVERNMENT

## 2022-06-08 ENCOUNTER — TELEPHONE (OUTPATIENT)
Dept: FAMILY MEDICINE | Facility: CLINIC | Age: 47
End: 2022-06-08

## 2022-06-08 DIAGNOSIS — Z79.899 ENCOUNTER FOR LONG-TERM (CURRENT) USE OF OTHER MEDICATIONS: Primary | ICD-10-CM

## 2022-06-08 NOTE — TELEPHONE ENCOUNTER
----- Message from Arline Conde sent at 6/8/2022 11:43 AM CDT -----  Patient called and would like to know if she can have her labs done when she come in the morning for her appointment and have someone call her with the results because she forgot to come in and have them done please give her a call at [phone]

## 2022-06-17 ENCOUNTER — PATIENT MESSAGE (OUTPATIENT)
Dept: FAMILY MEDICINE | Facility: CLINIC | Age: 47
End: 2022-06-17

## 2022-07-07 LAB
ALBUMIN SERPL-MCNC: 3.9 G/DL (ref 3.6–5.1)
ALBUMIN/GLOB SERPL: 1.6 (CALC) (ref 1–2.5)
ALP SERPL-CCNC: 51 U/L (ref 31–125)
ALT SERPL-CCNC: 11 U/L (ref 6–29)
AST SERPL-CCNC: 11 U/L (ref 10–35)
BASOPHILS # BLD AUTO: 51 CELLS/UL (ref 0–200)
BASOPHILS NFR BLD AUTO: 0.8 %
BILIRUB SERPL-MCNC: 0.5 MG/DL (ref 0.2–1.2)
BUN SERPL-MCNC: 17 MG/DL (ref 7–25)
BUN/CREAT SERPL: NORMAL (CALC) (ref 6–22)
CALCIUM SERPL-MCNC: 9.3 MG/DL (ref 8.6–10.2)
CHLORIDE SERPL-SCNC: 105 MMOL/L (ref 98–110)
CHOLEST SERPL-MCNC: 147 MG/DL
CHOLEST/HDLC SERPL: 2.8 (CALC)
CO2 SERPL-SCNC: 28 MMOL/L (ref 20–32)
CREAT SERPL-MCNC: 0.72 MG/DL (ref 0.5–1.1)
EOSINOPHIL # BLD AUTO: 128 CELLS/UL (ref 15–500)
EOSINOPHIL NFR BLD AUTO: 2 %
ERYTHROCYTE [DISTWIDTH] IN BLOOD BY AUTOMATED COUNT: 11.9 % (ref 11–15)
GLOBULIN SER CALC-MCNC: 2.5 G/DL (CALC) (ref 1.9–3.7)
GLUCOSE SERPL-MCNC: 88 MG/DL (ref 65–99)
HCT VFR BLD AUTO: 38.7 % (ref 35–45)
HDLC SERPL-MCNC: 53 MG/DL
HGB BLD-MCNC: 12.7 G/DL (ref 11.7–15.5)
LDLC SERPL CALC-MCNC: 80 MG/DL (CALC)
LYMPHOCYTES # BLD AUTO: 2342 CELLS/UL (ref 850–3900)
LYMPHOCYTES NFR BLD AUTO: 36.6 %
MCH RBC QN AUTO: 29.9 PG (ref 27–33)
MCHC RBC AUTO-ENTMCNC: 32.8 G/DL (ref 32–36)
MCV RBC AUTO: 91.1 FL (ref 80–100)
MONOCYTES # BLD AUTO: 352 CELLS/UL (ref 200–950)
MONOCYTES NFR BLD AUTO: 5.5 %
NEUTROPHILS # BLD AUTO: 3526 CELLS/UL (ref 1500–7800)
NEUTROPHILS NFR BLD AUTO: 55.1 %
NONHDLC SERPL-MCNC: 94 MG/DL (CALC)
PLATELET # BLD AUTO: 284 THOUSAND/UL (ref 140–400)
PMV BLD REES-ECKER: 10.3 FL (ref 7.5–12.5)
POTASSIUM SERPL-SCNC: 4.8 MMOL/L (ref 3.5–5.3)
PROT SERPL-MCNC: 6.4 G/DL (ref 6.1–8.1)
RBC # BLD AUTO: 4.25 MILLION/UL (ref 3.8–5.1)
SODIUM SERPL-SCNC: 138 MMOL/L (ref 135–146)
TRIGL SERPL-MCNC: 56 MG/DL
TSH SERPL-ACNC: 1.44 MIU/L
WBC # BLD AUTO: 6.4 THOUSAND/UL (ref 3.8–10.8)

## 2022-07-14 ENCOUNTER — OFFICE VISIT (OUTPATIENT)
Dept: FAMILY MEDICINE | Facility: CLINIC | Age: 47
End: 2022-07-14
Payer: OTHER GOVERNMENT

## 2022-07-14 VITALS
HEIGHT: 68 IN | SYSTOLIC BLOOD PRESSURE: 104 MMHG | BODY MASS INDEX: 30.92 KG/M2 | DIASTOLIC BLOOD PRESSURE: 62 MMHG | OXYGEN SATURATION: 99 % | WEIGHT: 204 LBS | HEART RATE: 64 BPM

## 2022-07-14 DIAGNOSIS — E03.9 ACQUIRED HYPOTHYROIDISM: Primary | ICD-10-CM

## 2022-07-14 DIAGNOSIS — Z12.11 SCREENING FOR MALIGNANT NEOPLASM OF COLON: ICD-10-CM

## 2022-07-14 DIAGNOSIS — L98.9 SKIN LESION: ICD-10-CM

## 2022-07-14 PROCEDURE — 99214 OFFICE O/P EST MOD 30 MIN: CPT | Mod: S$GLB,,, | Performed by: PHYSICIAN ASSISTANT

## 2022-07-14 PROCEDURE — 99214 PR OFFICE/OUTPT VISIT, EST, LEVL IV, 30-39 MIN: ICD-10-PCS | Mod: S$GLB,,, | Performed by: PHYSICIAN ASSISTANT

## 2022-07-14 RX ORDER — LEVOTHYROXINE SODIUM 50 UG/1
50 TABLET ORAL DAILY
Qty: 90 TABLET | Refills: 1 | Status: SHIPPED | OUTPATIENT
Start: 2022-07-14 | End: 2023-01-10 | Stop reason: SDUPTHER

## 2022-07-14 NOTE — PROGRESS NOTES
SUBJECTIVE:    Patient ID: Tori Escalona is a 46 y.o. female.    Chief Complaint: Follow-up (6 mo f/u//no med bottles//colonoscopy ref made to [facility]//pt will have another dr. Order mammogram//tc)    This is a 45 yo wf presenting today for regular 6 month checkup. She has been doing well with regard to weight loss. Down another 30 lbs for a total of 72 lbs since surgery. She is feeling well, good energy. Monitors pressure closely. Off medication at this time. Only concern is regarding diarrhea at times since surgery. Surgeon does not think this os related to the procedure. She has no blood in the stool. No FH of colon cancer. She has not had screening colonoscopy. She does bring my attention to a lesion to her left LE that is raised, waxy and elevated. Thinks changing over the past several months.      Telephone on 06/08/2022   Component Date Value Ref Range Status    WBC 07/06/2022 6.4  3.8 - 10.8 Thousand/uL Final    RBC 07/06/2022 4.25  3.80 - 5.10 Million/uL Final    Hemoglobin 07/06/2022 12.7  11.7 - 15.5 g/dL Final    Hematocrit 07/06/2022 38.7  35.0 - 45.0 % Final    MCV 07/06/2022 91.1  80.0 - 100.0 fL Final    MCH 07/06/2022 29.9  27.0 - 33.0 pg Final    MCHC 07/06/2022 32.8  32.0 - 36.0 g/dL Final    RDW 07/06/2022 11.9  11.0 - 15.0 % Final    Platelets 07/06/2022 284  140 - 400 Thousand/uL Final    MPV 07/06/2022 10.3  7.5 - 12.5 fL Final    Neutrophils, Abs 07/06/2022 3,526  1,500 - 7,800 cells/uL Final    Lymph # 07/06/2022 2,342  850 - 3,900 cells/uL Final    Mono # 07/06/2022 352  200 - 950 cells/uL Final    Eos # 07/06/2022 128  15 - 500 cells/uL Final    Baso # 07/06/2022 51  0 - 200 cells/uL Final    Neutrophils Relative 07/06/2022 55.1  % Final    Lymph % 07/06/2022 36.6  % Final    Mono % 07/06/2022 5.5  % Final    Eosinophil % 07/06/2022 2.0  % Final    Basophil % 07/06/2022 0.8  % Final    Glucose 07/06/2022 88  65 - 99 mg/dL Final    BUN 07/06/2022 17  7 - 25 mg/dL  Final    Creatinine 07/06/2022 0.72  0.50 - 1.10 mg/dL Final    eGFR if non African American 07/06/2022 101  > OR = 60 mL/min/1.73m2 Final    eGFR if  07/06/2022 117  > OR = 60 mL/min/1.73m2 Final    BUN/Creatinine Ratio 07/06/2022 NOT APPLICABLE  6 - 22 (calc) Final    Sodium 07/06/2022 138  135 - 146 mmol/L Final    Potassium 07/06/2022 4.8  3.5 - 5.3 mmol/L Final    Chloride 07/06/2022 105  98 - 110 mmol/L Final    CO2 07/06/2022 28  20 - 32 mmol/L Final    Calcium 07/06/2022 9.3  8.6 - 10.2 mg/dL Final    Total Protein 07/06/2022 6.4  6.1 - 8.1 g/dL Final    Albumin 07/06/2022 3.9  3.6 - 5.1 g/dL Final    Globulin, Total 07/06/2022 2.5  1.9 - 3.7 g/dL (calc) Final    Albumin/Globulin Ratio 07/06/2022 1.6  1.0 - 2.5 (calc) Final    Total Bilirubin 07/06/2022 0.5  0.2 - 1.2 mg/dL Final    Alkaline Phosphatase 07/06/2022 51  31 - 125 U/L Final    AST 07/06/2022 11  10 - 35 U/L Final    ALT 07/06/2022 11  6 - 29 U/L Final    Cholesterol 07/06/2022 147  <200 mg/dL Final    HDL 07/06/2022 53  > OR = 50 mg/dL Final    Triglycerides 07/06/2022 56  <150 mg/dL Final    LDL Cholesterol 07/06/2022 80  mg/dL (calc) Final    HDL/Cholesterol Ratio 07/06/2022 2.8  <5.0 (calc) Final    Non HDL Chol. (LDL+VLDL) 07/06/2022 94  <130 mg/dL (calc) Final    TSH w/reflex to FT4 07/06/2022 1.44  mIU/L Final       Past Medical History:   Diagnosis Date    Thyroid disease      Past Surgical History:   Procedure Laterality Date    BARIATRIC SURGERY  2021     Family History   Problem Relation Age of Onset    Osteoporosis Mother     Hypertension Father        Marital Status:   Alcohol History:  reports no history of alcohol use.  Tobacco History:  reports that she has never smoked. She has never used smokeless tobacco.  Drug History:  reports no history of drug use.    Review of patient's allergies indicates:  No Known Allergies    Current Outpatient Medications:     hyoscyamine (LEVSIN/SL)  Therapy duplication "0.125 mg Subl, DISSOLVE 1 TABLET UNDER THE TONGUE FOUR TIMES DAILY, Disp: , Rfl:     levothyroxine (SYNTHROID) 50 MCG tablet, Take 1 tablet (50 mcg total) by mouth once daily., Disp: 90 tablet, Rfl: 1    multivitamin capsule, Take 1 capsule by mouth once daily., Disp: , Rfl:     pantoprazole (PROTONIX) 40 MG tablet, Take 40 mg by mouth once daily., Disp: , Rfl:     UNABLE TO FIND, celebrate, Disp: , Rfl:     Review of Systems   Constitutional: Negative for appetite change, chills, fatigue, fever and unexpected weight change.   HENT: Negative for congestion.    Respiratory: Negative for cough, chest tightness and shortness of breath.    Cardiovascular: Negative for chest pain and palpitations.   Gastrointestinal: Positive for diarrhea. Negative for abdominal distention and abdominal pain.   Endocrine: Negative for cold intolerance and heat intolerance.   Genitourinary: Negative for difficulty urinating and dysuria.   Musculoskeletal: Negative for arthralgias and back pain.   Skin:        Skin lesion   Neurological: Negative for dizziness, weakness and headaches.          Objective:      Vitals:    07/14/22 1538   BP: 104/62   Pulse: 64   SpO2: 99%   Weight: 92.5 kg (204 lb)   Height: 5' 8" (1.727 m)     Physical Exam  Constitutional:       General: She is not in acute distress.     Appearance: She is well-developed.   HENT:      Head: Normocephalic and atraumatic.   Eyes:      Conjunctiva/sclera: Conjunctivae normal.      Pupils: Pupils are equal, round, and reactive to light.   Neck:      Thyroid: No thyromegaly.   Cardiovascular:      Rate and Rhythm: Normal rate and regular rhythm.      Heart sounds: Normal heart sounds.   Pulmonary:      Effort: Pulmonary effort is normal.      Breath sounds: Normal breath sounds.   Abdominal:      General: Bowel sounds are normal. There is no distension.      Palpations: Abdomen is soft.      Tenderness: There is no abdominal tenderness.   Musculoskeletal:         General: " Normal range of motion.      Cervical back: Normal range of motion and neck supple.   Skin:     General: Skin is warm and dry.      Findings: Lesion (left LE: waxy elevated with slight telangiectiasia present) present. No erythema.   Neurological:      Mental Status: She is alert and oriented to person, place, and time.      Cranial Nerves: No cranial nerve deficit.               Assessment:       1. Acquired hypothyroidism    2. Screening for malignant neoplasm of colon    3. Skin lesion         Plan:       Acquired hypothyroidism  Comments:  TSH at goal. refills of current dose. continue as is.  Orders:  -     levothyroxine (SYNTHROID) 50 MCG tablet; Take 1 tablet (50 mcg total) by mouth once daily.  Dispense: 90 tablet; Refill: 1    Screening for malignant neoplasm of colon  Comments:  given dractic change in bowel hapbits. possibility related to surgery but would check c-scope anyway  Orders:  -     Ambulatory referral/consult to Gastroenterology    Skin lesion  Comments:  will refer to derm for further evaluation and likely biopsy  Orders:  -     Ambulatory referral/consult to Dermatology      Follow up in about 6 months (around 1/14/2023).        7/14/2022 Rachid Nevarez PA-C

## 2022-08-19 ENCOUNTER — OFFICE VISIT (OUTPATIENT)
Dept: DERMATOLOGY | Facility: CLINIC | Age: 47
End: 2022-08-19
Payer: OTHER GOVERNMENT

## 2022-08-19 VITALS — BODY MASS INDEX: 30.92 KG/M2 | HEIGHT: 68 IN | WEIGHT: 204 LBS

## 2022-08-19 DIAGNOSIS — D48.5 NEOPLASM OF UNCERTAIN BEHAVIOR OF SKIN: Primary | ICD-10-CM

## 2022-08-19 PROCEDURE — 88341 IMHCHEM/IMCYTCHM EA ADD ANTB: CPT | Mod: 59 | Performed by: PATHOLOGY

## 2022-08-19 PROCEDURE — 88341 PR IHC OR ICC EACH ADD'L SINGLE ANTIBODY  STAINPR: ICD-10-PCS | Mod: 26,,, | Performed by: PATHOLOGY

## 2022-08-19 PROCEDURE — 11106 INCAL BX SKN SINGLE LES: CPT | Mod: S$PBB,,, | Performed by: DERMATOLOGY

## 2022-08-19 PROCEDURE — 88342 CHG IMMUNOCYTOCHEMISTRY: ICD-10-PCS | Mod: 26,,, | Performed by: PATHOLOGY

## 2022-08-19 PROCEDURE — 99213 OFFICE O/P EST LOW 20 MIN: CPT | Mod: PBBFAC,PO | Performed by: DERMATOLOGY

## 2022-08-19 PROCEDURE — 99999 PR PBB SHADOW E&M-EST. PATIENT-LVL III: CPT | Mod: PBBFAC,,, | Performed by: DERMATOLOGY

## 2022-08-19 PROCEDURE — 11106 INCAL BX SKN SINGLE LES: CPT | Mod: PBBFAC,PO | Performed by: DERMATOLOGY

## 2022-08-19 PROCEDURE — 88342 IMHCHEM/IMCYTCHM 1ST ANTB: CPT | Mod: 26,,, | Performed by: PATHOLOGY

## 2022-08-19 PROCEDURE — 88342 IMHCHEM/IMCYTCHM 1ST ANTB: CPT | Performed by: PATHOLOGY

## 2022-08-19 PROCEDURE — 11106 PR INCISIONAL BIOPSY, SKIN, SINGLE LESION: ICD-10-PCS | Mod: S$PBB,,, | Performed by: DERMATOLOGY

## 2022-08-19 PROCEDURE — 88304 PR  SURG PATH,LEVEL III: ICD-10-PCS | Mod: 26,,, | Performed by: PATHOLOGY

## 2022-08-19 PROCEDURE — 88342 IMHCHEM/IMCYTCHM 1ST ANTB: CPT | Mod: 91 | Performed by: PATHOLOGY

## 2022-08-19 PROCEDURE — 99499 UNLISTED E&M SERVICE: CPT | Mod: S$PBB,,, | Performed by: DERMATOLOGY

## 2022-08-19 PROCEDURE — 88305 TISSUE EXAM BY PATHOLOGIST: CPT | Performed by: PATHOLOGY

## 2022-08-19 PROCEDURE — 88341 IMHCHEM/IMCYTCHM EA ADD ANTB: CPT | Mod: 26,,, | Performed by: PATHOLOGY

## 2022-08-19 PROCEDURE — 88304 TISSUE EXAM BY PATHOLOGIST: CPT | Mod: 26,,, | Performed by: PATHOLOGY

## 2022-08-19 PROCEDURE — 99499 NO LOS: ICD-10-PCS | Mod: S$PBB,,, | Performed by: DERMATOLOGY

## 2022-08-19 PROCEDURE — 99999 PR PBB SHADOW E&M-EST. PATIENT-LVL III: ICD-10-PCS | Mod: PBBFAC,,, | Performed by: DERMATOLOGY

## 2022-08-19 NOTE — PROGRESS NOTES
Subjective:       Patient ID:  Tori Escalona is a 46 y.o. female who presents for   Chief Complaint   Patient presents with    Lesion     LLE       New patient    Here today for a lesion on LLE-per PCP request- has been there for several months without any real symptoms  Does not recall any trauma    Has no hx of NMSC  Has no fhx of MM    Current Outpatient Medications:     hyoscyamine (LEVSIN/SL) 0.125 mg Subl, DISSOLVE 1 TABLET UNDER THE TONGUE FOUR TIMES DAILY, Disp: , Rfl:     levothyroxine (SYNTHROID) 50 MCG tablet, Take 1 tablet (50 mcg total) by mouth once daily., Disp: 90 tablet, Rfl: 1    multivitamin capsule, Take 1 capsule by mouth once daily., Disp: , Rfl:     pantoprazole (PROTONIX) 40 MG tablet, Take 40 mg by mouth once daily., Disp: , Rfl:     UNABLE TO FIND, celebrate, Disp: , Rfl:         Review of Systems   Constitutional: Negative for fever, chills and fatigue.   Respiratory: Negative for shortness of breath.    Skin: Positive for daily sunscreen use and activity-related sunscreen use. Negative for itching, rash, dry skin and wears hat.   Hematologic/Lymphatic: Does not bruise/bleed easily.        Objective:    Physical Exam   Skin:   Areas Examined (abnormalities noted in diagram):   RLE Inspected  LLE Inspection Performed              Diagram Legend     Erythematous scaling macule/papule c/w actinic keratosis       Vascular papule c/w angioma      Pigmented verrucoid papule/plaque c/w seborrheic keratosis      Yellow umbilicated papule c/w sebaceous hyperplasia      Irregularly shaped tan macule c/w lentigo     1-2 mm smooth white papules consistent with Milia      Movable subcutaneous cyst with punctum c/w epidermal inclusion cyst      Subcutaneous movable cyst c/w pilar cyst      Firm pink to brown papule c/w dermatofibroma      Pedunculated fleshy papule(s) c/w skin tag(s)      Evenly pigmented macule c/w junctional nevus     Mildly variegated pigmented, slightly irregular-bordered  macule c/w mildly atypical nevus      Flesh colored to evenly pigmented papule c/w intradermal nevus       Pink pearly papule/plaque c/w basal cell carcinoma      Erythematous hyperkeratotic cursted plaque c/w SCC      Surgical scar with no sign of skin cancer recurrence      Open and closed comedones      Inflammatory papules and pustules      Verrucoid papule consistent consistent with wart     Erythematous eczematous patches and plaques     Dystrophic onycholytic nail with subungual debris c/w onychomycosis     Umbilicated papule    Erythematous-base heme-crusted tan verrucoid plaque consistent with inflamed seborrheic keratosis     Erythematous Silvery Scaling Plaque c/w Psoriasis     See annotation          Assessment / Plan:      Pathology Orders:     Normal Orders This Visit    Specimen to Pathology, Dermatology     Questions:    Procedure Type: Dermatology and skin neoplasms    Number of Specimens: 1    ------------------------: -------------------------    Spec 1 Procedure: Excision >2cm    Spec 1 Clinical Impression: 1cm pink subcutaneous nodule, no obvious punctum, EIC vs lymphoma vs lipoma    Spec 1 Source: left shin    Release to patient:         Neoplasm of uncertain behavior of skin  -     Specimen to Pathology, Dermatology    PREPARATION: The diagnosis, procedure, alternatives, benefits and risks, including but not limited to: infection, bleeding/bruising, drug reactions, pain, scar or cosmetic defect, local sensation disturbances, wound dehiscence (separation of wound edges after sutures removed) and/or recurrence of present condition were explained to the patient. The patient elected to proceed.  Patient's identity was verified using 2 patient identifiers and the side and site was verified.  Time out period with surgeon, assistant and patient in surgical suite was taken.    PROCEDURE: The location noted above was prepped and draped in the usual sterile fashion. The area was anesthetized with  intradermal buffered xylocaine. An elliptiform excision with a #15 scalpel was performed to access the  nodule and dissection was carried out around it.  Electrocoagulation was used to obtain hemostasis. Blood loss was minimal. The wound was then superficially closed with simple interrupted sutures of 4.0 Prolene.   Lesion size: 1 cm  Final incision length: 1.2 cm         No follow-ups on file.

## 2022-08-23 ENCOUNTER — PATIENT MESSAGE (OUTPATIENT)
Dept: FAMILY MEDICINE | Facility: CLINIC | Age: 47
End: 2022-08-23

## 2022-08-23 DIAGNOSIS — N63.0 BREAST LUMP: Primary | ICD-10-CM

## 2022-08-30 ENCOUNTER — TELEPHONE (OUTPATIENT)
Dept: DERMATOLOGY | Facility: CLINIC | Age: 47
End: 2022-08-30
Payer: OTHER GOVERNMENT

## 2022-08-30 NOTE — TELEPHONE ENCOUNTER
Notified by dermatopathologist Dr Valladares that specimen had been sent to [place] for further evaluation. Will delay results reporting. Will update patient whenever possible. Spoke to Tori to let her know.

## 2022-09-02 ENCOUNTER — CLINICAL SUPPORT (OUTPATIENT)
Dept: DERMATOLOGY | Facility: CLINIC | Age: 47
End: 2022-09-02
Payer: OTHER GOVERNMENT

## 2022-09-02 DIAGNOSIS — D48.5 NEOPLASM OF UNCERTAIN BEHAVIOR OF SKIN: Primary | ICD-10-CM

## 2022-09-02 PROCEDURE — 99024 POSTOP FOLLOW-UP VISIT: CPT | Mod: ,,, | Performed by: DERMATOLOGY

## 2022-09-02 PROCEDURE — 99024 PR POST-OP FOLLOW-UP VISIT: ICD-10-PCS | Mod: ,,, | Performed by: DERMATOLOGY

## 2022-09-02 NOTE — PROGRESS NOTES
Patient presents for suture removal. The wound is well healed without signs of infection.  The sutures are removed. Wound care and activity instructions given. Return prn.

## 2022-09-16 LAB
FINAL PATHOLOGIC DIAGNOSIS: NORMAL
GROSS: NORMAL
Lab: NORMAL

## 2022-09-22 ENCOUNTER — PATIENT MESSAGE (OUTPATIENT)
Dept: FAMILY MEDICINE | Facility: CLINIC | Age: 47
End: 2022-09-22

## 2022-09-22 DIAGNOSIS — Z13.5 SCREENING FOR EYE CONDITION: Primary | ICD-10-CM

## 2022-09-26 ENCOUNTER — PATIENT MESSAGE (OUTPATIENT)
Dept: FAMILY MEDICINE | Facility: CLINIC | Age: 47
End: 2022-09-26

## 2022-10-18 ENCOUNTER — PROCEDURE VISIT (OUTPATIENT)
Dept: DERMATOLOGY | Facility: CLINIC | Age: 47
End: 2022-10-18
Payer: OTHER GOVERNMENT

## 2022-10-18 DIAGNOSIS — L72.12 TRICHILEMMAL PROLIFERATING CYST: Primary | ICD-10-CM

## 2022-10-18 PROCEDURE — 12031 INTMD RPR S/A/T/EXT 2.5 CM/<: CPT | Mod: PBBFAC,PO | Performed by: DERMATOLOGY

## 2022-10-18 PROCEDURE — 99499 NO LOS: ICD-10-PCS | Mod: S$PBB,,, | Performed by: DERMATOLOGY

## 2022-10-18 PROCEDURE — 12031 PR LAYR CLOS WND TRUNK,ARM,LEG <2.5 CM: ICD-10-PCS | Mod: S$PBB,,, | Performed by: DERMATOLOGY

## 2022-10-18 PROCEDURE — 11401 EXC TR-EXT B9+MARG 0.6-1 CM: CPT | Mod: PBBFAC,PO | Performed by: DERMATOLOGY

## 2022-10-18 PROCEDURE — 12031 INTMD RPR S/A/T/EXT 2.5 CM/<: CPT | Mod: S$PBB,,, | Performed by: DERMATOLOGY

## 2022-10-18 PROCEDURE — 88305 TISSUE EXAM BY PATHOLOGIST: ICD-10-PCS | Mod: 26,,, | Performed by: PATHOLOGY

## 2022-10-18 PROCEDURE — 11401 EXC TR-EXT B9+MARG 0.6-1 CM: CPT | Mod: S$PBB,51,, | Performed by: DERMATOLOGY

## 2022-10-18 PROCEDURE — 88305 TISSUE EXAM BY PATHOLOGIST: CPT | Performed by: PATHOLOGY

## 2022-10-18 PROCEDURE — 88305 TISSUE EXAM BY PATHOLOGIST: CPT | Mod: 26,,, | Performed by: PATHOLOGY

## 2022-10-18 PROCEDURE — 99499 UNLISTED E&M SERVICE: CPT | Mod: S$PBB,,, | Performed by: DERMATOLOGY

## 2022-10-18 PROCEDURE — 11401 PR EXC SKIN BENIG 0.6-1 CM TRUNK,ARM,LEG: ICD-10-PCS | Mod: S$PBB,51,, | Performed by: DERMATOLOGY

## 2022-10-18 NOTE — PATIENT INSTRUCTIONS
Surgery Wound Care    Your doctor has performed an excision today.  A bandage and vaseline ointment has been placed over the site.  This should remain in place for 24 hours.  It is recommended that you keep the area dry for the first 24 hours.  After 24 hours, you may remove the band aid and wash the area with warm soap and water and apply Vaseline jelly or aquaphore.  Many patients prefer to use Neosporin or Bacitracin ointment.  This is acceptable; however know that you can develop an allergy to this medication even if you have used it safely for years.  It is important to keep the area moist.  Letting it dry out and get air slows healing time, will worsen the scar, and make it more difficult to remove the stitches if they were placed.        If you notice increasing redness, tenderness, pain, or yellow drainage at the biopsy or surgical site, please notify your doctor.  These are signs of an infection.    If your biopsy/surgical site is bleeding, apply firm pressure for 15 minutes straight.  Repeat for another 15 minutes, if it is still bleeding.   If the surgical site continues to bleed, then please contact your doctor.    [facility] - DERMATOLOGY  [address] [place] [address] [address]  [facility] [id number]  Dept: [phone]

## 2022-10-18 NOTE — PROGRESS NOTES
Subjective:       Patient ID:  Tori Escalona is a 46 y.o. female who presents for   Chief Complaint   Patient presents with    Spot     SCC left shin     Pt here for definitive excision of SCC left shin.  Feeling well today.   Denies pacemaker, defibrillator.  No blood thinners.   No additional cutaneous complaints.     Left shin, [id number], 08/19/2022: Fragments of tissue consisting of an   atypical squamous proliferation with proliferating trichilemmal features,   involving biopsy border   COMMENT   The neoplasm is positive for CK 5/6, p40 and EMA (focal) and is negative for   CEA, keratin 7, keratin 20 and NUT stain. 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   This lesion should be treated as a low grade squamous cell carcinoma with   complete re-excision to ensure complete removal.   ANCILLARY TESTS   Hannah, keratin 7, keratin 20, p40 and NUT stains were performed at [facility] on sections from the tissue block provided by the referring   institution and CK 5/6, EMA and CEA stains were performed at the referring   institution.         Review of Systems   Constitutional:  Negative for fever and chills.      Objective:    Physical Exam   Constitutional: She appears well-developed and well-nourished. No distress.   Neurological: She is alert and oriented to person, place, and time. She is not disoriented.   Psychiatric: She has a normal mood and affect.   Skin:   Areas Examined (abnormalities noted in diagram):   LLE Inspection Performed            Diagram Legend     Erythematous scaling macule/papule c/w actinic keratosis       Vascular papule c/w angioma      Pigmented verrucoid papule/plaque c/w seborrheic keratosis      Yellow umbilicated papule c/w sebaceous hyperplasia      Irregularly shaped tan macule c/w lentigo     1-2 mm smooth white papules consistent with Milia      Movable subcutaneous  cyst with punctum c/w epidermal inclusion cyst      Subcutaneous movable cyst c/w pilar cyst      Firm pink to brown papule c/w dermatofibroma      Pedunculated fleshy papule(s) c/w skin tag(s)      Evenly pigmented macule c/w junctional nevus     Mildly variegated pigmented, slightly irregular-bordered macule c/w mildly atypical nevus      Flesh colored to evenly pigmented papule c/w intradermal nevus       Pink pearly papule/plaque c/w basal cell carcinoma      Erythematous hyperkeratotic cursted plaque c/w SCC      Surgical scar with no sign of skin cancer recurrence      Open and closed comedones      Inflammatory papules and pustules      Verrucoid papule consistent consistent with wart     Erythematous eczematous patches and plaques     Dystrophic onycholytic nail with subungual debris c/w onychomycosis     Umbilicated papule    Erythematous-base heme-crusted tan verrucoid plaque consistent with inflamed seborrheic keratosis     Erythematous Silvery Scaling Plaque c/w Psoriasis     See annotation          Assessment / Plan:      Pathology Orders:       Normal Orders This Visit    Specimen to Pathology, Dermatology     Comments:    Number of Specimens:->1  ------------------------->-------------------------  Spec 1 Procedure:->Excision >2cm  Spec 1 Clinical Impression:->proliferating trichilemmal cyst  reexcision  Spec 1 Source:->left shin    Questions:    Procedure Type: Dermatology and skin neoplasms    Number of Specimens: 1    ------------------------: -------------------------    Spec 1 Procedure: Excision >2cm    Spec 1 Clinical Impression: proliferating trichilemmal cyst reexcision    Spec 1 Source: left shin    Release to patient:           Trichilemmal proliferating cyst  -     Specimen to Pathology, Dermatology  PREPARATION: The diagnosis, procedure, alternatives, benefits and risks, including but not limited to: infection, bleeding/bruising, drug reactions, pain, scar or cosmetic defect, local  sensation disturbances, wound dehiscence (separation of wound edges after sutures removed) and/or recurrence of present condition were explained to the patient. The patient elected to proceed.  Patient's identity was verified using 2 patient identifiers and the side and site was verified.  Time out period with surgeon, assistant and patient in surgical suite was taken.    PROCEDURE: The location noted above was prepped and draped in the usual sterile fashion. The area was anesthetized with intradermal buffered xylocaine. An elliptiform excision with a #15 scalpel was performed to access the  cyst wall, and dissection was carried out around the cyst wall.  Electrocoagulation was used to obtain hemostasis. Blood loss was minimal. The wound was then approximated in a layered fashion with 3 intradermal sutures of 3.0 Monocryl, and the wound was then superficially closed with simple interrupted sutures of 3.0 Prolene.   Lesion size: 1 cm  Final incision length: 2.2 cm          Follow up in about 10 days (around 10/28/2022) for suture removal.

## 2022-10-24 LAB
FINAL PATHOLOGIC DIAGNOSIS: NORMAL
GROSS: NORMAL
Lab: NORMAL
MICROSCOPIC EXAM: NORMAL

## 2022-10-28 ENCOUNTER — CLINICAL SUPPORT (OUTPATIENT)
Dept: DERMATOLOGY | Facility: CLINIC | Age: 47
End: 2022-10-28
Payer: OTHER GOVERNMENT

## 2022-10-28 DIAGNOSIS — L72.12 TRICHILEMMAL PROLIFERATING CYST: Primary | ICD-10-CM

## 2022-10-28 PROCEDURE — 99024 POSTOP FOLLOW-UP VISIT: CPT | Mod: ,,, | Performed by: DERMATOLOGY

## 2022-10-28 PROCEDURE — 99024 PR POST-OP FOLLOW-UP VISIT: ICD-10-PCS | Mod: ,,, | Performed by: DERMATOLOGY

## 2023-01-06 DIAGNOSIS — Z79.899 ENCOUNTER FOR LONG-TERM (CURRENT) USE OF OTHER MEDICATIONS: Primary | ICD-10-CM

## 2023-01-07 LAB
ALBUMIN SERPL-MCNC: 3.9 G/DL (ref 3.6–5.1)
ALBUMIN/GLOB SERPL: 1.4 (CALC) (ref 1–2.5)
ALP SERPL-CCNC: 49 U/L (ref 31–125)
ALT SERPL-CCNC: 11 U/L (ref 6–29)
APPEARANCE UR: CLEAR
AST SERPL-CCNC: 13 U/L (ref 10–35)
BACTERIA #/AREA URNS HPF: ABNORMAL /HPF
BACTERIA UR CULT: ABNORMAL
BASOPHILS # BLD AUTO: 30 CELLS/UL (ref 0–200)
BASOPHILS NFR BLD AUTO: 0.5 %
BILIRUB SERPL-MCNC: 0.6 MG/DL (ref 0.2–1.2)
BILIRUB UR QL STRIP: NEGATIVE
BUN SERPL-MCNC: 13 MG/DL (ref 7–25)
BUN/CREAT SERPL: NORMAL (CALC) (ref 6–22)
CALCIUM SERPL-MCNC: 8.9 MG/DL (ref 8.6–10.2)
CHLORIDE SERPL-SCNC: 105 MMOL/L (ref 98–110)
CHOLEST SERPL-MCNC: 148 MG/DL
CHOLEST/HDLC SERPL: 2.9 (CALC)
CO2 SERPL-SCNC: 30 MMOL/L (ref 20–32)
COLOR UR: YELLOW
CREAT SERPL-MCNC: 0.76 MG/DL (ref 0.5–0.99)
EGFR: 98 ML/MIN/1.73M2
EOSINOPHIL # BLD AUTO: 100 CELLS/UL (ref 15–500)
EOSINOPHIL NFR BLD AUTO: 1.7 %
ERYTHROCYTE [DISTWIDTH] IN BLOOD BY AUTOMATED COUNT: 11.8 % (ref 11–15)
GLOBULIN SER CALC-MCNC: 2.8 G/DL (CALC) (ref 1.9–3.7)
GLUCOSE SERPL-MCNC: 89 MG/DL (ref 65–99)
GLUCOSE UR QL STRIP: NEGATIVE
HCT VFR BLD AUTO: 40.5 % (ref 35–45)
HDLC SERPL-MCNC: 51 MG/DL
HGB BLD-MCNC: 13.2 G/DL (ref 11.7–15.5)
HGB UR QL STRIP: NEGATIVE
HYALINE CASTS #/AREA URNS LPF: ABNORMAL /LPF
KETONES UR QL STRIP: NEGATIVE
LDLC SERPL CALC-MCNC: 84 MG/DL (CALC)
LEUKOCYTE ESTERASE UR QL STRIP: NEGATIVE
LYMPHOCYTES # BLD AUTO: 1888 CELLS/UL (ref 850–3900)
LYMPHOCYTES NFR BLD AUTO: 32 %
MCH RBC QN AUTO: 29.7 PG (ref 27–33)
MCHC RBC AUTO-ENTMCNC: 32.6 G/DL (ref 32–36)
MCV RBC AUTO: 91.2 FL (ref 80–100)
MONOCYTES # BLD AUTO: 454 CELLS/UL (ref 200–950)
MONOCYTES NFR BLD AUTO: 7.7 %
NEUTROPHILS # BLD AUTO: 3428 CELLS/UL (ref 1500–7800)
NEUTROPHILS NFR BLD AUTO: 58.1 %
NITRITE UR QL STRIP: NEGATIVE
NONHDLC SERPL-MCNC: 97 MG/DL (CALC)
PH UR STRIP: 6.5 [PH] (ref 5–8)
PLATELET # BLD AUTO: 287 THOUSAND/UL (ref 140–400)
PMV BLD REES-ECKER: 10.5 FL (ref 7.5–12.5)
POTASSIUM SERPL-SCNC: 4.4 MMOL/L (ref 3.5–5.3)
PROT SERPL-MCNC: 6.7 G/DL (ref 6.1–8.1)
PROT UR QL STRIP: NEGATIVE
RBC # BLD AUTO: 4.44 MILLION/UL (ref 3.8–5.1)
RBC #/AREA URNS HPF: ABNORMAL /HPF
SERVICE CMNT-IMP: ABNORMAL
SODIUM SERPL-SCNC: 139 MMOL/L (ref 135–146)
SP GR UR STRIP: 1.03 (ref 1–1.03)
SQUAMOUS #/AREA URNS HPF: ABNORMAL /HPF
TRIGL SERPL-MCNC: 54 MG/DL
TSH SERPL-ACNC: 1.51 MIU/L
WBC # BLD AUTO: 5.9 THOUSAND/UL (ref 3.8–10.8)
WBC #/AREA URNS HPF: ABNORMAL /HPF

## 2023-01-09 ENCOUNTER — TELEPHONE (OUTPATIENT)
Dept: FAMILY MEDICINE | Facility: CLINIC | Age: 48
End: 2023-01-09

## 2023-01-09 NOTE — TELEPHONE ENCOUNTER
----- Message from Kip Graham MD sent at 1/8/2023  4:30 PM CST -----  Call patient.  Her urinalysis looks very good.  No sign of infection.

## 2023-01-10 ENCOUNTER — OFFICE VISIT (OUTPATIENT)
Dept: FAMILY MEDICINE | Facility: CLINIC | Age: 48
End: 2023-01-10
Payer: OTHER GOVERNMENT

## 2023-01-10 VITALS
HEIGHT: 68 IN | DIASTOLIC BLOOD PRESSURE: 70 MMHG | BODY MASS INDEX: 31.22 KG/M2 | OXYGEN SATURATION: 99 % | WEIGHT: 206 LBS | SYSTOLIC BLOOD PRESSURE: 112 MMHG | HEART RATE: 94 BPM

## 2023-01-10 DIAGNOSIS — E03.9 ACQUIRED HYPOTHYROIDISM: ICD-10-CM

## 2023-01-10 DIAGNOSIS — Z12.11 COLON CANCER SCREENING: ICD-10-CM

## 2023-01-10 DIAGNOSIS — Z23 NEED FOR INFLUENZA VACCINATION: Primary | ICD-10-CM

## 2023-01-10 PROCEDURE — 90471 FLU VACCINE - QUADRIVALENT (RECOMBINANT) PRESERVATIVE FREE: ICD-10-PCS | Mod: S$GLB,,, | Performed by: PHYSICIAN ASSISTANT

## 2023-01-10 PROCEDURE — 90682 FLU VACCINE - QUADRIVALENT (RECOMBINANT) PRESERVATIVE FREE: ICD-10-PCS | Mod: S$GLB,,, | Performed by: PHYSICIAN ASSISTANT

## 2023-01-10 PROCEDURE — 99396 PR PREVENTIVE VISIT,EST,40-64: ICD-10-PCS | Mod: 25,S$GLB,, | Performed by: PHYSICIAN ASSISTANT

## 2023-01-10 PROCEDURE — 90682 RIV4 VACC RECOMBINANT DNA IM: CPT | Mod: S$GLB,,, | Performed by: PHYSICIAN ASSISTANT

## 2023-01-10 PROCEDURE — 99396 PREV VISIT EST AGE 40-64: CPT | Mod: 25,S$GLB,, | Performed by: PHYSICIAN ASSISTANT

## 2023-01-10 PROCEDURE — 90471 IMMUNIZATION ADMIN: CPT | Mod: S$GLB,,, | Performed by: PHYSICIAN ASSISTANT

## 2023-01-10 RX ORDER — LEVOTHYROXINE SODIUM 50 UG/1
50 TABLET ORAL DAILY
Qty: 90 TABLET | Refills: 1 | Status: CANCELLED | OUTPATIENT
Start: 2023-01-10 | End: 2023-07-09

## 2023-01-10 RX ORDER — LEVOTHYROXINE SODIUM 50 UG/1
50 TABLET ORAL DAILY
Qty: 90 TABLET | Refills: 1 | Status: SHIPPED | OUTPATIENT
Start: 2023-01-10 | End: 2023-07-13 | Stop reason: SDUPTHER

## 2023-01-10 NOTE — PROGRESS NOTES
SUBJECTIVE:    Patient ID: Tori Escalona is a 47 y.o. female.    Chief Complaint: 6 month f/u (Bottles brought/ Mgram done at [facility] already/) and Medication Refill    This is a 48 yo female who presents for regular checkup. She reports that she has done well since gastric sleeve. Weight has stayed off. Planning to exercise more in the coming months. Knows that she is not eating as well as she should. She is considering cologuard vs colonoscopy. Will let me know ultimately what she wishes to try.      Orders Only on 01/06/2023   Component Date Value Ref Range Status    WBC 01/06/2023 5.9  3.8 - 10.8 Thousand/uL Final    RBC 01/06/2023 4.44  3.80 - 5.10 Million/uL Final    Hemoglobin 01/06/2023 13.2  11.7 - 15.5 g/dL Final    Hematocrit 01/06/2023 40.5  35.0 - 45.0 % Final    MCV 01/06/2023 91.2  80.0 - 100.0 fL Final    MCH 01/06/2023 29.7  27.0 - 33.0 pg Final    MCHC 01/06/2023 32.6  32.0 - 36.0 g/dL Final    RDW 01/06/2023 11.8  11.0 - 15.0 % Final    Platelets 01/06/2023 287  140 - 400 Thousand/uL Final    MPV 01/06/2023 10.5  7.5 - 12.5 fL Final    Neutrophils, Abs 01/06/2023 3,428  1,500 - 7,800 cells/uL Final    Lymph # 01/06/2023 1,888  850 - 3,900 cells/uL Final    Mono # 01/06/2023 454  200 - 950 cells/uL Final    Eos # 01/06/2023 100  15 - 500 cells/uL Final    Baso # 01/06/2023 30  0 - 200 cells/uL Final    Neutrophils Relative 01/06/2023 58.1  % Final    Lymph % 01/06/2023 32.0  % Final    Mono % 01/06/2023 7.7  % Final    Eosinophil % 01/06/2023 1.7  % Final    Basophil % 01/06/2023 0.5  % Final    Glucose 01/06/2023 89  65 - 99 mg/dL Final    BUN 01/06/2023 13  7 - 25 mg/dL Final    Creatinine 01/06/2023 0.76  0.50 - 0.99 mg/dL Final    eGFR 01/06/2023 98  > OR = 60 mL/min/1.73m2 Final    BUN/Creatinine Ratio 01/06/2023 NOT APPLICABLE  6 - 22 (calc) Final    Sodium 01/06/2023 139  135 - 146 mmol/L Final    Potassium 01/06/2023 4.4  3.5 - 5.3 mmol/L Final    Chloride 01/06/2023 105  98 - 110 mmol/L  "Final    CO2 01/06/2023 30  20 - 32 mmol/L Final    Calcium 01/06/2023 8.9  8.6 - 10.2 mg/dL Final    Total Protein 01/06/2023 6.7  6.1 - 8.1 g/dL Final    Albumin 01/06/2023 3.9  3.6 - 5.1 g/dL Final    Globulin, Total 01/06/2023 2.8  1.9 - 3.7 g/dL (calc) Final    Albumin/Globulin Ratio 01/06/2023 1.4  1.0 - 2.5 (calc) Final    Total Bilirubin 01/06/2023 0.6  0.2 - 1.2 mg/dL Final    Alkaline Phosphatase 01/06/2023 49  31 - 125 U/L Final    AST 01/06/2023 13  10 - 35 U/L Final    ALT 01/06/2023 11  6 - 29 U/L Final    Cholesterol 01/06/2023 148  <200 mg/dL Final    HDL 01/06/2023 51  > OR = 50 mg/dL Final    Triglycerides 01/06/2023 54  <150 mg/dL Final    LDL Cholesterol 01/06/2023 84  mg/dL (calc) Final    HDL/Cholesterol Ratio 01/06/2023 2.9  <5.0 (calc) Final    Non HDL Chol. (LDL+VLDL) 01/06/2023 97  <130 mg/dL (calc) Final    TSH w/reflex to FT4 01/06/2023 1.51  mIU/L Final   Procedure visit on 10/18/2022   Component Date Value Ref Range Status    Final Pathologic Diagnosis 10/18/2022    Final                    Value:Skin, left shin, excision:  -SCAR (POST-SURGICAL)  -NEGATIVE FOR RESIDUAL ATYPICAL SQUAMOUS PROLIFERATION (WITH PROLIFERATING  TRICHOLEMMAL FEATURES)      Gross 10/18/2022    Final                    Value:Container Label: Clinic Number/AP Number:  [id number], and "left shin"  Received in formalin is a 1.3 x 1.2 x 0.7 cm soft, tan-yellow, fibroadipose  tissue with attached 1.2 x 0.5 cm unoriented ellipse of tan skin.  Specimen  is inked, trisected, and entirely submitted in [facility]      Microscopic Exam 10/18/2022    Final                    Value:Sections show a recent surgical scar.  No elements of the original lesion are  identified.      Disclaimer 10/18/2022    Final                    Value:Unless the case is a 'gross only' or additional testing only, the final  diagnosis for each specimen is based on a microscopic examination of  appropriate tissue sections.   " "  Office Visit on 08/19/2022   Component Date Value Ref Range Status    Final Pathologic Diagnosis 08/19/2022    Final                    Value:[place] FINAL DIAGNOSIS  A. Left shin, [id number], 08/19/2022: Fragments of tissue consisting of an  atypical squamous proliferation with proliferating trichilemmal features,  involving biopsy border  COMMENT  The neoplasm is positive for CK 5/6, p40 and EMA (focal) and is negative for  CEA, keratin 7, keratin 20 and NUT stain. 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  This lesion should be treated as a low grade squamous cell carcinoma with  complete re-excision to ensure complete removal.  ANCILLARY TESTS  Hannah, keratin 7, keratin 20, p40 and NUT stains were performed at [facility] on sections from the tissue block provided by the referring  institution and CK 5/6, EMA and CEA stains were performed at the referring  institution.  Thank you for sharing this case with me. If you have any questions, please do  not hesitate to reach me by calling [facility] at  [phone]                          [phone].  Kip Parada M.D.  Report attached  Performing location:  [address] [address]      Gross 08/19/2022    Final                    Value:Container Label: Clinic Number/AP Number:  [id number], and "left shin"  Received in formalin is a 2.0 x 1.2 and 0.5 cm aggregate of soft, tan-white  fibromembranous tissue fragments with admixed adipose tissue.  Specimen is  filtered and entirely submitted in [facility].      Disclaimer 08/19/2022    Final                    Value:Unless the case is a 'gross only' or additional testing only, the final  diagnosis for each specimen is based on a microscopic examination of  appropriate tissue sections.         Past Medical History: " "  Diagnosis Date    Thyroid disease      Past Surgical History:   Procedure Laterality Date    BARIATRIC SURGERY  2021     Family History   Problem Relation Age of Onset    Osteoporosis Mother     Hypertension Father        Marital Status:   Alcohol History:  reports no history of alcohol use.  Tobacco History:  reports that she has never smoked. She has never used smokeless tobacco.  Drug History:  reports no history of drug use.    Review of patient's allergies indicates:  No Known Allergies    Current Outpatient Medications:     multivitamin capsule, Take 1 capsule by mouth once daily., Disp: , Rfl:     hyoscyamine (LEVSIN/SL) 0.125 mg Subl, DISSOLVE 1 TABLET UNDER THE TONGUE FOUR TIMES DAILY, Disp: , Rfl:     levothyroxine (SYNTHROID) 50 MCG tablet, Take 1 tablet (50 mcg total) by mouth once daily., Disp: 90 tablet, Rfl: 1    pantoprazole (PROTONIX) 40 MG tablet, Take 40 mg by mouth once daily., Disp: , Rfl:     UNABLE TO FIND, celebrate, Disp: , Rfl:     Review of Systems   Constitutional:  Negative for appetite change, chills, fatigue, fever and unexpected weight change.   HENT:  Negative for congestion.    Respiratory:  Negative for cough, chest tightness and shortness of breath.    Cardiovascular:  Negative for chest pain and palpitations.   Gastrointestinal:  Negative for abdominal distention and abdominal pain.   Endocrine: Negative for cold intolerance and heat intolerance.   Genitourinary:  Negative for difficulty urinating and dysuria.   Musculoskeletal:  Negative for arthralgias and back pain.   Neurological:  Negative for dizziness, weakness and headaches.        Objective:      Vitals:    01/10/23 0824   BP: 112/70   Pulse: 94   SpO2: 99%   Weight: 93.4 kg (206 lb)   Height: 5' 8" (1.727 m)     Physical Exam  Constitutional:       General: She is not in acute distress.     Appearance: She is well-developed.   HENT:      Head: Normocephalic and atraumatic.   Eyes:      Conjunctiva/sclera: " Conjunctivae normal.      Pupils: Pupils are equal, round, and reactive to light.   Neck:      Thyroid: No thyromegaly.   Cardiovascular:      Rate and Rhythm: Normal rate and regular rhythm.      Heart sounds: Normal heart sounds.   Pulmonary:      Effort: Pulmonary effort is normal.      Breath sounds: Normal breath sounds.   Abdominal:      General: Bowel sounds are normal. There is no distension.      Palpations: Abdomen is soft.      Tenderness: There is no abdominal tenderness.   Musculoskeletal:         General: Normal range of motion.      Cervical back: Normal range of motion and neck supple.   Skin:     General: Skin is warm and dry.      Findings: No erythema.   Neurological:      Mental Status: She is alert and oriented to person, place, and time.      Cranial Nerves: No cranial nerve deficit.         Assessment:       1. Need for influenza vaccination    2. Acquired hypothyroidism    3. Colon cancer screening           Plan:       Need for influenza vaccination  -     Influenza - Quadrivalent (Recombinant) (PF)    Acquired hypothyroidism  Comments:  TSH at goal. refills of current dose. continue as is.  Orders:  -     levothyroxine (SYNTHROID) 50 MCG tablet; Take 1 tablet (50 mcg total) by mouth once daily.  Dispense: 90 tablet; Refill: 1    Colon cancer screening  Comments:  considering cologuard vs c-scope. referral has been sent now.  Orders:  -     Ambulatory referral/consult to Gastroenterology; Future; Expected date: 01/10/2023      Follow up in about 6 months (around 7/10/2023) for checkup.        1/10/2023 Rachid Nevarez PA-C

## 2023-03-15 DIAGNOSIS — Z12.31 OTHER SCREENING MAMMOGRAM: ICD-10-CM

## 2023-04-11 ENCOUNTER — PATIENT MESSAGE (OUTPATIENT)
Dept: ADMINISTRATIVE | Facility: HOSPITAL | Age: 48
End: 2023-04-11
Payer: OTHER GOVERNMENT

## 2023-07-06 ENCOUNTER — TELEPHONE (OUTPATIENT)
Dept: FAMILY MEDICINE | Facility: CLINIC | Age: 48
End: 2023-07-06

## 2023-07-06 NOTE — TELEPHONE ENCOUNTER
----- Message from Arline Conde sent at 7/6/2023  3:37 PM CDT -----  Patient called and stated that she need to reschedule her appointment for Monday she stated she was a little confused she always see Brice and she would like to know why this appointment is with Dr Graham ?please give her a call at [phone]

## 2023-07-06 NOTE — TELEPHONE ENCOUNTER
Palliative Care Initial Consult Note    Patient Name:  Esperanza Pop   : 1946   Sex: female    Patient Care Team:  eMghana Rodgers MD as PCP - General  Demetrius Sagastume MD as Consulting Physician (Cardiology)  Kevan Lerma MD as Consulting Physician (Nephrology)  Geena Estrella APRN as Nurse Practitioner (Cardiology)  Frank Mandujano MD as Referring Physician (Colon and Rectal Surgery)  Kevan Cavazos MD as Consulting Physician (Radiation Oncology)  Yue Reeves RN as Nurse Navigator  Darryn Burk MD as Consulting Physician (Nephrology)    Advance care planning discussed:with pt and dtr in room  Code Status: No CPR  Advance Directive: none  Surrogate decision maker:NOK dtr    Referring Provider:Ranulfo Moreno  Reason for Consult:    Subjective     Patient is a 77 y.o. female presented to hospital with uncontrolled pain.  Reported as shaking due to pain every few minutes.  Reported with significant radiation burns to buttocks from XRT previously.  Family able to convey no therapy for CA offered at this time.  Pain is much improved from prior to hospitalization.  Calculated MME is 35.        Review of Systems  Review of Systems   Constitutional: Negative for appetite change, fatigue and unexpected weight change.   HENT: Negative.    Eyes: Negative.    Respiratory: Negative.    Cardiovascular: Negative.    Gastrointestinal: Positive for rectal pain. Negative for constipation.        C/O of pain 12/10 preior to hospital with pain in abd, rectal region, abdomen.  Has at times had bleeding from rectom but not currently   Genitourinary:        On HD   Musculoskeletal:        C/O pain BLE below knees described as sudden and severe, no tingling or numbness, no electric sensation.  Has had no edema   Skin: Positive for wound.        Reports improved buttock wounds   Neurological: Positive for weakness. Negative for dizziness, seizures, syncope, facial asymmetry, speech difficulty,  Spoke with patient and explained they generally just like a follow up with Dr. Graham once a year which she states she is fine with, she just wasn't aware. Got her rescheduled.    light-headedness and numbness.       History  Past Medical History:   Diagnosis Date   • Acute bronchitis    • Acute conjunctivitis    • Acute kidney injury     Admission from 12/26/2013 to 01/02/2014, now resolved with latest creatinine 1.4 on 01/08/2014.   • Acute non-ST segment elevation myocardial infarction (STEMI) following previous myocardial infarction    • Anal cancer 2/8/2023   • Anal cancer 2/8/2023   • Arthritis    • Breast cancer, female, right     2005 mastectomy right   • Cancer    • CHF (congestive heart failure)    • Chronic kidney disease     dialysis MW, f/u [facility]    • Clotting disorder    • COVID-19    • Diabetes mellitus     diagnosed ~1992, checks fsbg 2-3x/day   • Diarrhea    • Dyslipidemia    • Dyspepsia    • Dyspnea    • Edema    • History of transfusion     ~2013 no reaction    • Hx of radiation therapy    • Hyperlipidemia    • Hypertension     Severe   • Ischemic heart disease    • Moderate obesity     BMI 36.2   • Myocardial infarction    • Pneumonia    • Pneumonia 02/2019   • Radiation 2005   • Seasonal allergies    • Wears glasses      Past Surgical History:   Procedure Laterality Date   • AV FISTULA PLACEMENT, BRACHIOBASILIC     • BREAST BIOPSY Left 2010   • BREAST CYST EXCISION     • BREAST LUMPECTOMY Right 2005   • BREAST SURGERY     • CARDIAC CATHETERIZATION N/A 10/10/2016    Procedure: Left Heart Cath;  Surgeon: Scooter Zhao MD;  Location:  [facility];  Service:    • CARDIAC CATHETERIZATION  10/2016   • CARDIAC CATHETERIZATION N/A 1/21/2021    Procedure: Right and Left Heart Cath;  Surgeon: Hugh Tidwell MD;  Location:  [facility];  Service: Cardiology;  Laterality: N/A;   • COLONOSCOPY N/A 7/23/2020    Procedure: COLONOSCOPY;  Surgeon: Rubén Rosas MD;  Location:  [facility];  Service: Gastroenterology;  Laterality: N/A;   • CORONARY STENT PLACEMENT  2016   • HERNIA REPAIR     • HYSTERECTOMY  2000   •  INSERTION HEMODIALYSIS CATHETER Right 6/29/2016    Procedure: HEMODIALYSIS CATHETER INSERTION TUNNELLED;  Surgeon: Ramón Chavez MD;  Location: [facility];  Service:    • MASTECTOMY Right 2006   • OOPHORECTOMY     • REDUCTION MAMMAPLASTY Left 2005     Current Facility-Administered Medications   Medication Dose Route Frequency Provider Last Rate Last Admin   • acetaminophen (TYLENOL) tablet 650 mg  650 mg Oral TID Nuria Lynch MD   650 mg at 04/06/23 0918   • albumin human 25 % IV SOLN 25 g  25 g Intravenous PRN Gabe Butler MD       • amiodarone (PACERONE) tablet 200 mg  200 mg Oral Daily Nuria Lynch MD   200 mg at 04/06/23 0918   • apixaban (ELIQUIS) tablet 2.5 mg  2.5 mg Oral BID Nuria Lynch MD   2.5 mg at 04/06/23 0917   • aspirin EC tablet 81 mg  81 mg Oral Daily Nuria Lynch MD   81 mg at 04/06/23 0918   • atorvastatin (LIPITOR) tablet 80 mg  80 mg Oral Nightly Nuria Lynch MD   80 mg at 04/05/23 2129   • sennosides-docusate (PERICOLACE) 8.6-50 MG per tablet 2 tablet  2 tablet Oral BID Nuria Lynch MD   2 tablet at 04/06/23 0917    And   • bisacodyl (DULCOLAX) EC tablet 5 mg  5 mg Oral Daily PRN Nuria Lynch MD        And   • bisacodyl (DULCOLAX) suppository 10 mg  10 mg Rectal Daily PRN Nuria Lynch MD       • capsaicin (ZOSTRIX) 0.075 % topical cream   Topical Q12H Nuria Lynch MD   Given at 04/06/23 0918   • carvedilol (COREG) tablet 3.125 mg  3.125 mg Oral Q12H Nuria Lynch MD   3.125 mg at 04/06/23 0918   • cloNIDine (CATAPRES) tablet 0.1 mg  0.1 mg Oral TID PRN Nuria Lynch MD       • HYDROcodone-acetaminophen (NORCO) 5-325 MG per tablet 1 tablet  1 tablet Oral Q4H PRN Nuria Lynch MD   1 tablet at 04/05/23 2128   • HYDROmorphone (DILAUDID) injection 0.25 mg  0.25 mg Intravenous Q4H PRN Nuria Lynch MD   0.25 mg at 04/06/23 0918   • hydrOXYzine (ATARAX) tablet 25 mg  25 mg Oral TID PRN Nuria Lynch MD       • isosorbide mononitrate (IMDUR) 24 hr tablet 120 mg  120 mg Oral Daily  Nuria Lynch MD   120 mg at 04/06/23 0918   • melatonin tablet 5 mg  5 mg Oral Nightly PRN Nuria Lynch MD       • mirtazapine (REMERON) tablet 15 mg  15 mg Oral Nightly Nuria Lynch MD   15 mg at 04/05/23 2129   • ondansetron (ZOFRAN) injection 4 mg  4 mg Intravenous Q6H PRN Nuria Lynch MD       • sodium chloride 0.9 % flush 10 mL  10 mL Intravenous PRN Nuria Lynch MD   10 mL at 04/05/23 1712   • sodium chloride 0.9 % flush 10 mL  10 mL Intravenous Q12H Nuria Lynch MD   10 mL at 04/06/23 0919   • sodium chloride 0.9 % flush 10 mL  10 mL Intravenous PRN Nuria Lynch MD       • sodium chloride 0.9 % infusion 40 mL  40 mL Intravenous PRN Nuria Lynch MD            •  albumin human  •  senna-docusate sodium **AND** bisacodyl **AND** bisacodyl  •  cloNIDine  •  HYDROcodone-acetaminophen  •  HYDROmorphone  •  hydrOXYzine  •  melatonin  •  ondansetron  •  [COMPLETED] Insert Peripheral IV **AND** sodium chloride  •  sodium chloride  •  sodium chloride  Allergies   Allergen Reactions   • Mircera [Methoxy Polyethylene Glycol-Epoetin Beta] Anaphylaxis     Pt stopped breathing   • Venofer [Iron Sucrose] Anaphylaxis     Pt stopped breathing   • Albuterol Other (See Comments)     Increased blood pressure  Used right before heart attack   • Nifedipine Er Swelling   • Penicillins Hives   • Prednisone Other (See Comments)     Makes jittery/anxious     Family History   Problem Relation Age of Onset   • Stroke Mother    • Cancer Mother    • Pancreatic cancer Mother    • Coronary artery disease Father    • Heart failure Father    • Breast cancer Sister 55   • Throat cancer Daughter    • Colon cancer Maternal Grandmother    • Ovarian cancer Neg Hx    • Endometrial cancer Neg Hx      Social History     Socioeconomic History   • Marital status:    Tobacco Use   • Smoking status: Never   • Smokeless tobacco: Never   • Tobacco comments:     Michael second hand smoke   Vaping Use   • Vaping Use: Never used   Substance and  Sexual Activity   • Alcohol use: Not Currently   • Drug use: No   • Sexual activity: Defer       Objective     Vital Signs  Temp:  [97 °F (36.1 °C)-98.4 °F (36.9 °C)] 98.4 °F (36.9 °C)  Heart Rate:  [62-80] 64  Resp:  [14-18] 16  BP: ()/(45-84) 141/59    PPS:Palliative Performance Scale score as of 4/6/2023, 10:58 EDT is 30% based on the following measures:   Ambulation: Totally bed bound  Activity and Evidence of Disease: Unable to do any work, extensive evidence of disease  Self-Care: Total care  Intake: Reduced   LOC: Full, drowsy or confusion      Physical Exam:  Physical Exam  Constitutional:       General: She is not in acute distress.     Appearance: She is ill-appearing. She is not toxic-appearing or diaphoretic.   HENT:      Head: Normocephalic and atraumatic.      Nose: Nose normal.      Mouth/Throat:      Mouth: Mucous membranes are moist.      Pharynx: Oropharynx is clear. No oropharyngeal exudate or posterior oropharyngeal erythema.   Eyes:      General: No scleral icterus.     Extraocular Movements: Extraocular movements intact.      Conjunctiva/sclera: Conjunctivae normal.      Pupils: Pupils are equal, round, and reactive to light.   Cardiovascular:      Rate and Rhythm: Normal rate and regular rhythm.      Pulses: Normal pulses.      Heart sounds: Murmur heard.     No friction rub. No gallop.   Pulmonary:      Effort: Pulmonary effort is normal. No respiratory distress.      Breath sounds: Normal breath sounds. No wheezing, rhonchi or rales.   Abdominal:      General: Bowel sounds are normal. There is no distension.      Palpations: Abdomen is soft.      Tenderness: There is no abdominal tenderness.   Musculoskeletal:      Cervical back: Normal range of motion.      Comments: No acute joint erythema or effusion   Skin:     General: Skin is warm and dry.   Neurological:      Mental Status: She is alert and oriented to person, place, and time.      Motor: Weakness present.      Comments: Gait  unable to be assessed, weakness nonfocal   Psychiatric:         Mood and Affect: Mood normal.         Behavior: Behavior normal.         Results Review:   Lab Results   Component Value Date    HGBA1C 5.8 02/07/2023       Lab Results   Component Value Date    GLUCOSE 94 04/06/2023    BUN 20 04/06/2023    CREATININE 4.02 (H) 04/06/2023    EGFRIFNONA  07/21/2021      Comment:      <15 Indicative of kidney failure.    EGFRIFAFRI 14 (L) 07/21/2021    BCR 5.0 (L) 04/06/2023    K 4.9 04/06/2023    CO2 25.0 04/06/2023    CALCIUM 8.2 (L) 04/06/2023    PROTENTOTREF 7.5 09/22/2020    ALBUMIN 2.9 (L) 04/05/2023    LABIL2 1.1 09/22/2020    AST 37 (H) 04/05/2023    ALT 12 04/05/2023       WBC   Date Value Ref Range Status   04/05/2023 9.16 3.40 - 10.80 10*3/mm3 Final     RBC   Date Value Ref Range Status   04/05/2023 3.04 (L) 3.77 - 5.28 10*6/mm3 Final     Hemoglobin   Date Value Ref Range Status   04/05/2023 9.0 (L) 12.0 - 15.9 g/dL Final     Hematocrit   Date Value Ref Range Status   04/05/2023 30.9 (L) 34.0 - 46.6 % Final     MCV   Date Value Ref Range Status   04/05/2023 101.6 (H) 79.0 - 97.0 fL Final     MCH   Date Value Ref Range Status   04/05/2023 29.6 26.6 - 33.0 pg Final     MCHC   Date Value Ref Range Status   04/05/2023 29.1 (L) 31.5 - 35.7 g/dL Final     RDW   Date Value Ref Range Status   04/05/2023 18.2 (H) 12.3 - 15.4 % Final     RDW-SD   Date Value Ref Range Status   04/05/2023 67.1 (H) 37.0 - 54.0 fl Final     MPV   Date Value Ref Range Status   04/05/2023 10.8 6.0 - 12.0 fL Final     Platelets   Date Value Ref Range Status   04/05/2023 203 140 - 450 10*3/mm3 Final     Neutrophil %   Date Value Ref Range Status   04/05/2023 66.4 42.7 - 76.0 % Final     Lymphocyte %   Date Value Ref Range Status   04/05/2023 12.0 (L) 19.6 - 45.3 % Final     Monocyte %   Date Value Ref Range Status   04/05/2023 13.9 (H) 5.0 - 12.0 % Final     Eosinophil %   Date Value Ref Range Status   04/05/2023 6.8 (H) 0.3 - 6.2 % Final  "    Basophil %   Date Value Ref Range Status   04/05/2023 0.4 0.0 - 1.5 % Final     Immature Grans %   Date Value Ref Range Status   04/05/2023 0.5 0.0 - 0.5 % Final     Neutrophils, Absolute   Date Value Ref Range Status   04/05/2023 6.08 1.70 - 7.00 10*3/mm3 Final     Lymphocytes, Absolute   Date Value Ref Range Status   04/05/2023 1.10 0.70 - 3.10 10*3/mm3 Final     Monocytes, Absolute   Date Value Ref Range Status   04/05/2023 1.27 (H) 0.10 - 0.90 10*3/mm3 Final     Eosinophils, Absolute   Date Value Ref Range Status   04/05/2023 0.62 (H) 0.00 - 0.40 10*3/mm3 Final     Basophils, Absolute   Date Value Ref Range Status   04/05/2023 0.04 0.00 - 0.20 10*3/mm3 Final     Immature Grans, Absolute   Date Value Ref Range Status   04/05/2023 0.05 0.00 - 0.05 10*3/mm3 Final     nRBC   Date Value Ref Range Status   04/05/2023 0.0 0.0 - 0.2 /100 WBC Final       Impression:  Recta; CA  ESRD  Valvular heart dz  CAD  DM - A1c 5.8    Symptoms:  Rectal/Cancer pain  Debility    Recommendations:  Pt feels that she has adequate quality of life when pain is controlled. Does not want to stop HD at this time.  Discussed with pt and dtr that without treatment that her cancer will progress and there would not be a \"cure.\"  Discussed there would likely come a time when HD would no longer prolong life but rather prolong death and they needed to have a discussion about what the patient wants, about what things would make her not want HD.  Based on MME required for pain control, Initiated fentanyl 25 mcg patch with oxycodone for breakthrough pain.    Could consider gabapentin 100 mg bid if LE pain below knees not improving due to potential for neuropathic component. Team will continue to follow.      Total Visit Time:58 min  Face to Face Time:      Kayla Anaya MD  Hospice and Palliative Care Physician  [facility]  04/06/23  09:55 EDT  "

## 2023-07-13 DIAGNOSIS — E03.9 ACQUIRED HYPOTHYROIDISM: ICD-10-CM

## 2023-07-13 RX ORDER — LEVOTHYROXINE SODIUM 50 UG/1
50 TABLET ORAL DAILY
Qty: 90 TABLET | Refills: 1 | Status: SHIPPED | OUTPATIENT
Start: 2023-07-13 | End: 2023-07-14 | Stop reason: SDUPTHER

## 2023-07-14 RX ORDER — LEVOTHYROXINE SODIUM 50 UG/1
50 TABLET ORAL DAILY
Qty: 90 TABLET | Refills: 1 | Status: SHIPPED | OUTPATIENT
Start: 2023-07-14 | End: 2023-10-16 | Stop reason: SDUPTHER

## 2023-08-03 DIAGNOSIS — Z12.31 ENCOUNTER FOR SCREENING MAMMOGRAM FOR MALIGNANT NEOPLASM OF BREAST: Primary | ICD-10-CM

## 2023-08-16 ENCOUNTER — PATIENT MESSAGE (OUTPATIENT)
Dept: FAMILY MEDICINE | Facility: CLINIC | Age: 48
End: 2023-08-16

## 2023-08-16 DIAGNOSIS — Z12.11 SPECIAL SCREENING FOR MALIGNANT NEOPLASMS, COLON: Primary | ICD-10-CM

## 2023-08-25 LAB — BCS RECOMMENDATION EXT: NORMAL

## 2023-09-01 ENCOUNTER — PATIENT OUTREACH (OUTPATIENT)
Dept: ADMINISTRATIVE | Facility: HOSPITAL | Age: 48
End: 2023-09-01
Payer: OTHER GOVERNMENT

## 2023-09-01 NOTE — PROGRESS NOTES
Population Health Chart Review & Patient Outreach Details:     Reason for Outreach Encounter:     [x]  Non-Compliant Report   []  Payor Report ([employer], PHN, BCBS, MSSP, MCIP, UHC, etc.)   []  Pre-Visit Chart Review     Updates Requested / Reviewed:     []  Care Everywhere    []     []  External Sources ([facility], [facility], [facility], etc.)   [x]  Care Team Updated    Patient Outreach Method:    []  Telephone Outreach Completed   [] Successful   [] Left Voicemail   [] Unable to Contact (wrong number, no voicemail)  []  [facility] [facility] Outreach Sent  []  Letter Outreach Mailed  []  Fax Sent for External Records  [x]  External Records Upload    Health Maintenance Topics Addressed and Outreach Outcomes / Actions Taken:        [x]      Breast Cancer Screening []  Mammo Scheduled      []  External Records Requested     []  Added Reminder to Complete to Upcoming Primary Care Appt Notes     []  Patient Declined     []  Patient Will Call Back to Schedule     []  Patient Will Schedule with External Provider / Order Routed if Applicable             []       Cervical Cancer Screening []  Pap Scheduled      []  External Records Requested     []  Added Reminder to Complete to Upcoming Primary Care Appt Notes     []  Patient Declined     []  Patient Will Call Back to Schedule     []  Patient Will Schedule with External Provider               []          Colorectal Cancer Screening []  Colonoscopy Case Request or Referral Placed     []  External Records Requested     []  Added Reminder to Complete to Upcoming Primary Care Appt Notes     []  Patient Declined     []  Patient Will Call Back to Schedule     []  Patient Will Schedule with External Provider     []  Fit Kit Mailed (add the SmartPhrase under additional notes)     []  Reminded Patient to Complete Home Test             []      Diabetic Eye Exam []  Eye Camera Scheduled or Optometry Referral Placed     []  External Records Requested     []  Added Reminder to Complete to  Upcoming Primary Care Appt Notes     []  Patient Declined     []  Patient Will Call Back to Schedule     []  Patient Will Schedule with External Provider             []      Blood Pressure Control []  Primary Care Follow Up Visit Scheduled     []  Remote Blood Pressure Reading Captured     []  Added Reminder to Complete to Upcoming Primary Care Appt Notes     []  Patient Declined     []  Patient Will Call Back / Patient Will Send Portal Message with Reading     []  Patient Will Call Back to Schedule Provider Visit             []       HbA1c & Other Labs []  Lab Appt Scheduled for Due Labs     []  Primary Care Follow Up Visit Scheduled      []  Reminded Patient to Complete Home Test     []  Added Reminder to Complete to Upcoming Primary Care Appt Notes     []  Patient Declined     []  Patient Will Call Back to Schedule     []  Patient Will Schedule with External Provider / Order Routed if Applicable           []    Schedule Primary Care Appt []  Primary Care Appt Scheduled     []  Patient Declined     []  Patient Will Call Back to Schedule     []  Pt Established with External Provider & Updated Care Team             []      Medication Adherence []  Primary Care Appointment Scheduled     []  Added Reminder to Upcoming Primary Care Appt Notes     []  Patient Reminded to  Prescription     []  Patient Declined, Provider Notified if Needed     []  Sent Provider Message to Review and/or Add Exclusion to Problem List             []      Osteoporosis Screening []  DXA Appointment Scheduled     []  External Records Requested     []  Added Reminder to Complete to Upcoming Primary Care Appt Notes     []  Patient Declined     []  Patient Will Call Back to Schedule     []  Patient Will Schedule with External Provider / Order Routed if Applicable     Additional Care Coordinator Notes:         Further Action Needed If Patient Returns Outreach:

## 2023-09-07 LAB — NONINV COLON CA DNA+OCC BLD SCRN STL QL: NEGATIVE

## 2023-09-22 ENCOUNTER — PATIENT MESSAGE (OUTPATIENT)
Dept: FAMILY MEDICINE | Facility: CLINIC | Age: 48
End: 2023-09-22

## 2023-10-10 ENCOUNTER — PATIENT MESSAGE (OUTPATIENT)
Dept: FAMILY MEDICINE | Facility: CLINIC | Age: 48
End: 2023-10-10

## 2023-10-16 ENCOUNTER — OFFICE VISIT (OUTPATIENT)
Dept: FAMILY MEDICINE | Facility: CLINIC | Age: 48
End: 2023-10-16
Attending: FAMILY MEDICINE
Payer: OTHER GOVERNMENT

## 2023-10-16 VITALS
DIASTOLIC BLOOD PRESSURE: 68 MMHG | SYSTOLIC BLOOD PRESSURE: 116 MMHG | HEIGHT: 67 IN | BODY MASS INDEX: 34.21 KG/M2 | HEART RATE: 69 BPM | WEIGHT: 218 LBS | OXYGEN SATURATION: 99 %

## 2023-10-16 DIAGNOSIS — Z23 NEED FOR INFLUENZA VACCINATION: ICD-10-CM

## 2023-10-16 DIAGNOSIS — Z90.3 H/O GASTRIC SLEEVE: ICD-10-CM

## 2023-10-16 DIAGNOSIS — E03.9 ACQUIRED HYPOTHYROIDISM: Primary | ICD-10-CM

## 2023-10-16 DIAGNOSIS — E66.9 OBESITY (BMI 30.0-34.9): ICD-10-CM

## 2023-10-16 PROBLEM — I10 ESSENTIAL HYPERTENSION: Status: RESOLVED | Noted: 2020-04-21 | Resolved: 2023-10-16

## 2023-10-16 PROBLEM — E66.811 OBESITY (BMI 30.0-34.9): Status: ACTIVE | Noted: 2023-10-16

## 2023-10-16 PROCEDURE — 99214 OFFICE O/P EST MOD 30 MIN: CPT | Mod: 25,S$GLB,, | Performed by: FAMILY MEDICINE

## 2023-10-16 PROCEDURE — 90686 IIV4 VACC NO PRSV 0.5 ML IM: CPT | Mod: S$GLB,,, | Performed by: FAMILY MEDICINE

## 2023-10-16 PROCEDURE — 90471 FLU VACCINE (QUAD) GREATER THAN OR EQUAL TO 3YO PRESERVATIVE FREE IM: ICD-10-PCS | Mod: S$GLB,,, | Performed by: FAMILY MEDICINE

## 2023-10-16 PROCEDURE — 90471 IMMUNIZATION ADMIN: CPT | Mod: S$GLB,,, | Performed by: FAMILY MEDICINE

## 2023-10-16 PROCEDURE — 99214 PR OFFICE/OUTPT VISIT, EST, LEVL IV, 30-39 MIN: ICD-10-PCS | Mod: 25,S$GLB,, | Performed by: FAMILY MEDICINE

## 2023-10-16 PROCEDURE — 90686 FLU VACCINE (QUAD) GREATER THAN OR EQUAL TO 3YO PRESERVATIVE FREE IM: ICD-10-PCS | Mod: S$GLB,,, | Performed by: FAMILY MEDICINE

## 2023-10-16 RX ORDER — LEVOTHYROXINE SODIUM 50 UG/1
50 TABLET ORAL DAILY
Qty: 90 TABLET | Refills: 1 | Status: SHIPPED | OUTPATIENT
Start: 2023-10-16 | End: 2023-12-14

## 2023-10-16 NOTE — PROGRESS NOTES
SUBJECTIVE:    Patient ID: Tori Escalona is a 47 y.o. female.    Chief Complaint: Follow-up (Brought bottles, Follow up, No complaints, Med refill//BA )    47-year-old female working from home for .  Her children are all now in college.  She has not been doing any exercise lately.  She had a gastric sleeve in 2021 and is still 50 lb lighter at this point.  She did gained back about 10 lb recently.    Hypothyroidism-taking levothyroxine daily    Dr. Rubio dermatology-left shin squamous cell carcinoma excised    Positive for COVID 2 months ago, sinus symptoms and fatigue were her main problem    Gyn-Dr. Krause, Pap smear done    August 2023-mammogram done for Dr. Kenzie Bermudez, normal    2023-Cologuard was negative-RTC 3 years    Last cholesterol 148        Patient Outreach on 09/01/2023   Component Date Value Ref Range Status    BCS Recommendation External 08/25/2023 Repeat mammogram in 1 year   Final   Patient Message on 08/16/2023   Component Date Value Ref Range Status    Cologuard Result 09/01/2023 Negative  Negative Final       Past Medical History:   Diagnosis Date    Thyroid disease      Social History     Socioeconomic History    Marital status:    Tobacco Use    Smoking status: Never    Smokeless tobacco: Never   Substance and Sexual Activity    Alcohol use: No    Drug use: No    Sexual activity: Not Currently     Social Determinants of Health     Stress: No Stress Concern Present (1/21/2020)    [employer] [place] [employer] - Occupational Stress Questionnaire     Feeling of Stress : Not at all     Past Surgical History:   Procedure Laterality Date    BARIATRIC SURGERY  2021     Family History   Problem Relation Age of Onset    Osteoporosis Mother     Hypertension Father        The 10-year CVD risk score (JACKIE'Agoino, et al., 2008) is: 2.1%    Values used to calculate the score:      Age: 47 years      Sex: Female      Diabetic: No      Tobacco smoker: No      Systolic  "Blood Pressure: 116 mmHg      Is BP treated: No      HDL Cholesterol: 51 mg/dL      Total Cholesterol: 148 mg/dL    Tests to Keep You Healthy    Mammogram: Met on 8/25/2023  Colon Cancer Screening: Met on 9/1/2023  Cervical Cancer Screening: DUE  Last Blood Pressure <= 139/89 (10/16/2023): Yes      Review of patient's allergies indicates:  No Known Allergies    Current Outpatient Medications:     multivitamin capsule, Take 1 capsule by mouth once daily., Disp: , Rfl:     hyoscyamine (LEVSIN/SL) 0.125 mg Subl, DISSOLVE 1 TABLET UNDER THE TONGUE FOUR TIMES DAILY, Disp: , Rfl:     levothyroxine (SYNTHROID) 50 MCG tablet, Take 1 tablet (50 mcg total) by mouth once daily., Disp: 90 tablet, Rfl: 1    pantoprazole (PROTONIX) 40 MG tablet, Take 40 mg by mouth once daily., Disp: , Rfl:     UNABLE TO FIND, celebrate, Disp: , Rfl:     Review of Systems   Constitutional:  Negative for appetite change, chills, fatigue, fever and unexpected weight change.   HENT:  Negative for ear discharge and ear pain.    Eyes:  Negative for pain, discharge and visual disturbance.   Respiratory:  Negative for apnea, cough, shortness of breath and wheezing.    Cardiovascular:  Negative for chest pain, palpitations and leg swelling.   Gastrointestinal:  Negative for abdominal pain, blood in stool, constipation, diarrhea, nausea, vomiting and reflux.   Endocrine: Negative for cold intolerance, heat intolerance and polydipsia.   Genitourinary:  Negative for bladder incontinence, dysuria, hematuria, nocturia and urgency.   Musculoskeletal:  Negative for gait problem, joint swelling and myalgias.   Neurological:  Negative for dizziness, seizures and numbness.   Psychiatric/Behavioral:  Negative for behavioral problems and hallucinations. The patient is not nervous/anxious.            Objective:      Vitals:    10/16/23 1546   BP: 116/68   Pulse: 69   SpO2: 99%   Weight: 98.9 kg (218 lb)   Height: 5' 7" (1.702 m)     Physical Exam  Vitals and nursing " note reviewed.   Constitutional:       General: She is not in acute distress.     Appearance: She is well-developed. She is obese. She is not toxic-appearing.   HENT:      Head: Normocephalic and atraumatic.      Right Ear: Tympanic membrane and external ear normal.      Left Ear: Tympanic membrane and external ear normal.      Nose: Nose normal.      Mouth/Throat:      Pharynx: Oropharynx is clear.   Eyes:      Pupils: Pupils are equal, round, and reactive to light.   Neck:      Thyroid: No thyromegaly.      Vascular: No carotid bruit.   Cardiovascular:      Rate and Rhythm: Normal rate and regular rhythm.      Heart sounds: Normal heart sounds. No murmur heard.  Pulmonary:      Effort: Pulmonary effort is normal.      Breath sounds: Normal breath sounds. No wheezing or rales.   Abdominal:      General: Bowel sounds are normal. There is no distension.      Palpations: Abdomen is soft.      Tenderness: There is no abdominal tenderness.   Musculoskeletal:         General: No tenderness or deformity. Normal range of motion.      Cervical back: Normal range of motion and neck supple.      Lumbar back: Normal. No spasms.      Comments: Bends 90 degrees at  waist, shoulders and knees good range of motion without crepitance.  No pitting edema to lower extremities   Lymphadenopathy:      Cervical: No cervical adenopathy.   Skin:     General: Skin is warm and dry.      Findings: No rash.   Neurological:      Mental Status: She is alert and oriented to person, place, and time. Mental status is at baseline.      Cranial Nerves: No cranial nerve deficit.      Coordination: Coordination normal.      Gait: Gait normal.   Psychiatric:         Mood and Affect: Mood normal.         Behavior: Behavior normal.         Thought Content: Thought content normal.         Judgment: Judgment normal.           Assessment:       1. Acquired hypothyroidism    2. Need for influenza vaccination    3. Obesity (BMI 30.0-34.9)    4. H/O gastric  sleeve         Plan:       Acquired hypothyroidism  Comments:  TSH at goal. refills of current dose. continue as is.  Orders:  -     levothyroxine (SYNTHROID) 50 MCG tablet; Take 1 tablet (50 mcg total) by mouth once daily.  Dispense: 90 tablet; Refill: 1  -     CBC Auto Differential; Future; Expected date: 10/16/2023  -     Comprehensive Metabolic Panel; Future; Expected date: 10/16/2023  -     TSH w/reflex to FT4; Future; Expected date: 10/16/2023  Needs lab work repeated soon  Need for influenza vaccination  -     Influenza - Quadrivalent *Preferred* (6 months+) (PF)  Flu shot today  Obesity (BMI 30.0-34.9)  Recommend adding low impact exercise such as walking or water aerobics 3 times a week  H/O gastric sleeve  Doing well, needs to eat lower carb snacks    Follow up in about 6 months (around 4/16/2024), or [facility], hypothyroid, gastric sleeve.        10/16/2023 Kip Graham

## 2023-12-13 DIAGNOSIS — E03.9 ACQUIRED HYPOTHYROIDISM: ICD-10-CM

## 2023-12-14 RX ORDER — LEVOTHYROXINE SODIUM 50 UG/1
50 TABLET ORAL
Qty: 90 TABLET | Refills: 1 | Status: SHIPPED | OUTPATIENT
Start: 2023-12-14 | End: 2024-06-11

## 2024-04-16 ENCOUNTER — PATIENT MESSAGE (OUTPATIENT)
Dept: FAMILY MEDICINE | Facility: CLINIC | Age: 49
End: 2024-04-16
Payer: OTHER GOVERNMENT

## 2024-04-18 ENCOUNTER — OFFICE VISIT (OUTPATIENT)
Dept: FAMILY MEDICINE | Facility: CLINIC | Age: 49
End: 2024-04-18
Payer: OTHER GOVERNMENT

## 2024-04-18 VITALS
RESPIRATION RATE: 18 BRPM | HEIGHT: 67 IN | OXYGEN SATURATION: 98 % | WEIGHT: 206 LBS | DIASTOLIC BLOOD PRESSURE: 69 MMHG | HEART RATE: 70 BPM | BODY MASS INDEX: 32.33 KG/M2 | SYSTOLIC BLOOD PRESSURE: 110 MMHG

## 2024-04-18 DIAGNOSIS — Z01.419 WELL WOMAN EXAM: Primary | ICD-10-CM

## 2024-04-18 DIAGNOSIS — E03.9 ACQUIRED HYPOTHYROIDISM: ICD-10-CM

## 2024-04-18 PROCEDURE — 99396 PREV VISIT EST AGE 40-64: CPT | Mod: S$GLB,,, | Performed by: PHYSICIAN ASSISTANT

## 2024-04-18 RX ORDER — LEVOTHYROXINE SODIUM 50 UG/1
50 TABLET ORAL
Qty: 90 TABLET | Refills: 1 | Status: SHIPPED | OUTPATIENT
Start: 2024-04-18 | End: 2024-04-18 | Stop reason: SDUPTHER

## 2024-04-18 RX ORDER — LEVOTHYROXINE SODIUM 50 UG/1
50 TABLET ORAL
Qty: 90 TABLET | Refills: 3 | Status: SHIPPED | OUTPATIENT
Start: 2024-04-18 | End: 2025-04-18

## 2024-04-18 NOTE — PROGRESS NOTES
SUBJECTIVE:    Patient ID: Tori Escalona is a 48 y.o. female.    Chief Complaint: Follow-up (Brought bottles/ refills needed//lab results// pt states she have no complaints today// pt will need referral for  Cervical Cancer Screening with Dr Vivar)    47 yo female presents for regular checkup. Reports that she has been doing well. S/p bariatric surgery and down about 50-60 lb overall. Has plateaued recently and planning to get it better together with diet.    Follow-up  Pertinent negatives include no arthralgias, chest pain, headaches, joint swelling, neck pain, vomiting or weakness.       No visits with results within 6 Month(s) from this visit.   Latest known visit with results is:   Office Visit on 10/16/2023   Component Date Value Ref Range Status    Glucose 04/17/2024 86  65 - 99 mg/dL Final    BUN 04/17/2024 16  7 - 25 mg/dL Final    Creatinine 04/17/2024 0.71  0.50 - 0.99 mg/dL Final    eGFR 04/17/2024 105  > OR = 60 mL/min/1.73m2 Final    BUN/Creatinine Ratio 04/17/2024 SEE NOTE:  6 - 22 (calc) Final    Sodium 04/17/2024 139  135 - 146 mmol/L Final    Potassium 04/17/2024 4.1  3.5 - 5.3 mmol/L Final    Chloride 04/17/2024 106  98 - 110 mmol/L Final    CO2 04/17/2024 27  20 - 32 mmol/L Final    Calcium 04/17/2024 9.1  8.6 - 10.2 mg/dL Final    Total Protein 04/17/2024 6.5  6.1 - 8.1 g/dL Final    Albumin 04/17/2024 4.1  3.6 - 5.1 g/dL Final    Globulin, Total 04/17/2024 2.4  1.9 - 3.7 g/dL (calc) Final    Albumin/Globulin Ratio 04/17/2024 1.7  1.0 - 2.5 (calc) Final    Total Bilirubin 04/17/2024 0.6  0.2 - 1.2 mg/dL Final    Alkaline Phosphatase 04/17/2024 54  31 - 125 U/L Final    AST 04/17/2024 13  10 - 35 U/L Final    ALT 04/17/2024 10  6 - 29 U/L Final    TSH w/reflex to FT4 04/17/2024 1.21  mIU/L Final       Past Medical History:   Diagnosis Date    Thyroid disease      Past Surgical History:   Procedure Laterality Date    BARIATRIC SURGERY  2021     Family History   Problem Relation Name Age of  "Onset    Osteoporosis Mother      Hypertension Father         Marital Status:   Alcohol History:  reports no history of alcohol use.  Tobacco History:  reports that she has never smoked. She has never used smokeless tobacco.  Drug History:  reports no history of drug use.    Review of patient's allergies indicates:  No Known Allergies    Current Outpatient Medications:     levothyroxine (SYNTHROID) 50 MCG tablet, Take 1 tablet (50 mcg total) by mouth before breakfast., Disp: 90 tablet, Rfl: 3    UNABLE TO FIND, celebrate, Disp: , Rfl:     Review of Systems   Constitutional:  Negative for activity change and unexpected weight change.   HENT:  Negative for hearing loss, rhinorrhea and trouble swallowing.    Eyes:  Negative for discharge and visual disturbance.   Respiratory:  Negative for chest tightness and wheezing.    Cardiovascular:  Negative for chest pain and palpitations.   Gastrointestinal:  Negative for blood in stool, constipation, diarrhea and vomiting.   Endocrine: Negative for polydipsia and polyuria.   Genitourinary:  Negative for difficulty urinating, dysuria, hematuria and menstrual problem.   Musculoskeletal:  Negative for arthralgias, joint swelling and neck pain.   Neurological:  Negative for weakness and headaches.   Psychiatric/Behavioral:  Negative for confusion and dysphoric mood.           Objective:      Vitals:    04/18/24 0809   BP: 110/69   Pulse: 70   Resp: 18   SpO2: 98%   Weight: 93.4 kg (206 lb)   Height: 5' 7" (1.702 m)     Physical Exam  Vitals and nursing note reviewed.   Constitutional:       General: She is not in acute distress.     Appearance: She is well-developed. She is obese. She is not toxic-appearing.   HENT:      Head: Normocephalic and atraumatic.      Right Ear: Tympanic membrane and external ear normal.      Left Ear: Tympanic membrane and external ear normal.      Nose: Nose normal.      Mouth/Throat:      Pharynx: Oropharynx is clear.   Eyes:      Pupils: " Pupils are equal, round, and reactive to light.   Neck:      Thyroid: No thyromegaly.      Vascular: No carotid bruit.   Cardiovascular:      Rate and Rhythm: Normal rate and regular rhythm.      Heart sounds: Normal heart sounds. No murmur heard.  Pulmonary:      Effort: Pulmonary effort is normal.      Breath sounds: Normal breath sounds. No wheezing or rales.   Abdominal:      General: Bowel sounds are normal. There is no distension.      Palpations: Abdomen is soft.      Tenderness: There is no abdominal tenderness.   Musculoskeletal:         General: No tenderness or deformity. Normal range of motion.      Cervical back: Normal range of motion and neck supple.      Lumbar back: Normal. No spasms.      Comments: Bends 90 degrees at  waist, shoulders and knees good range of motion without crepitance.  No pitting edema to lower extremities   Lymphadenopathy:      Cervical: No cervical adenopathy.   Skin:     General: Skin is warm and dry.      Findings: No rash.   Neurological:      Mental Status: She is alert and oriented to person, place, and time. Mental status is at baseline.      Cranial Nerves: No cranial nerve deficit.      Coordination: Coordination normal.      Gait: Gait normal.   Psychiatric:         Mood and Affect: Mood normal.         Behavior: Behavior normal.         Thought Content: Thought content normal.         Judgment: Judgment normal.           Assessment:       1. Well woman exam    2. Acquired hypothyroidism         Plan:       Well woman exam  Comments:  referral for well woman exam- Dr. Vivar  Orders:  -     Ambulatory referral/consult to Obstetrics / Gynecology; Future; Expected date: 04/18/2024    Acquired hypothyroidism  Comments:  TSH at goal. refills of current dose. continue as is.  Orders:  -     Discontinue: levothyroxine (SYNTHROID) 50 MCG tablet; Take 1 tablet (50 mcg total) by mouth before breakfast.  Dispense: 90 tablet; Refill: 1  -     levothyroxine (SYNTHROID) 50 MCG  tablet; Take 1 tablet (50 mcg total) by mouth before breakfast.  Dispense: 90 tablet; Refill: 3      Follow up in about 1 year (around 4/18/2025) for Annual Physical.        4/18/2024 Rachid Nevarez PA-C

## 2024-04-23 ENCOUNTER — PATIENT MESSAGE (OUTPATIENT)
Dept: ADMINISTRATIVE | Facility: HOSPITAL | Age: 49
End: 2024-04-23
Payer: OTHER GOVERNMENT

## 2024-10-02 ENCOUNTER — PATIENT MESSAGE (OUTPATIENT)
Dept: FAMILY MEDICINE | Facility: CLINIC | Age: 49
End: 2024-10-02
Payer: OTHER GOVERNMENT

## 2024-12-18 ENCOUNTER — TELEPHONE (OUTPATIENT)
Dept: FAMILY MEDICINE | Facility: CLINIC | Age: 49
End: 2024-12-18
Payer: OTHER GOVERNMENT

## 2024-12-18 DIAGNOSIS — R92.8 ABNORMAL MAMMOGRAM OF LEFT BREAST: ICD-10-CM

## 2024-12-18 DIAGNOSIS — R92.8 ABNORMAL MAMMOGRAM OF BOTH BREASTS: Primary | ICD-10-CM

## 2024-12-18 NOTE — TELEPHONE ENCOUNTER
----- Message from Nurse Porter sent at 12/18/2024  3:06 PM CST -----  Contact: Dr. Bermudez    ----- Message -----  From: Leny Irizarry  Sent: 12/18/2024   2:59 PM CST  To: [facility] Staff    Pt needs a  auth for a breast ultrasound and a mammogram and OV for Dr. Bermudez. [facility] #[phone] option 5

## 2024-12-18 NOTE — TELEPHONE ENCOUNTER
Last visit [facility] 4/18/2024  No answer for pt  Spoke to patient, had abn mammogram left breast @ [facility], so needs diagnostic and US and goes to Dr. Elizabeth for the follow up appt   Will get copy from [facility]

## 2025-04-09 ENCOUNTER — TELEPHONE (OUTPATIENT)
Dept: FAMILY MEDICINE | Facility: CLINIC | Age: 50
End: 2025-04-09

## 2025-04-09 DIAGNOSIS — E66.811 OBESITY (BMI 30.0-34.9): ICD-10-CM

## 2025-04-09 DIAGNOSIS — Z00.00 ANNUAL PHYSICAL EXAM: ICD-10-CM

## 2025-04-09 DIAGNOSIS — E03.9 ACQUIRED HYPOTHYROIDISM: Primary | ICD-10-CM

## 2025-04-09 DIAGNOSIS — Z79.899 ENCOUNTER FOR LONG-TERM (CURRENT) USE OF MEDICATIONS: ICD-10-CM

## 2025-04-28 ENCOUNTER — TELEPHONE (OUTPATIENT)
Dept: FAMILY MEDICINE | Facility: CLINIC | Age: 50
End: 2025-04-28

## 2025-04-28 NOTE — TELEPHONE ENCOUNTER
----- Message from Leny sent at 4/28/2025  9:01 AM CDT -----  Pt needs to reschedule her cancelled appt. Pt #[phone]

## 2025-04-30 ENCOUNTER — RESULTS FOLLOW-UP (OUTPATIENT)
Dept: FAMILY MEDICINE | Facility: CLINIC | Age: 50
End: 2025-04-30

## 2025-05-07 ENCOUNTER — PATIENT MESSAGE (OUTPATIENT)
Dept: FAMILY MEDICINE | Facility: CLINIC | Age: 50
End: 2025-05-07
Payer: OTHER GOVERNMENT

## 2025-05-19 ENCOUNTER — OFFICE VISIT (OUTPATIENT)
Dept: FAMILY MEDICINE | Facility: CLINIC | Age: 50
End: 2025-05-19
Payer: OTHER GOVERNMENT

## 2025-05-19 VITALS
BODY MASS INDEX: 32.33 KG/M2 | RESPIRATION RATE: 18 BRPM | HEIGHT: 67 IN | DIASTOLIC BLOOD PRESSURE: 66 MMHG | OXYGEN SATURATION: 97 % | HEART RATE: 64 BPM | WEIGHT: 206 LBS | SYSTOLIC BLOOD PRESSURE: 104 MMHG

## 2025-05-19 DIAGNOSIS — E03.9 ACQUIRED HYPOTHYROIDISM: Primary | ICD-10-CM

## 2025-05-19 DIAGNOSIS — Z01.419 WELL WOMAN EXAM: ICD-10-CM

## 2025-05-19 DIAGNOSIS — Z01.00 EYE EXAM, ROUTINE: ICD-10-CM

## 2025-05-19 PROCEDURE — 99214 OFFICE O/P EST MOD 30 MIN: CPT | Mod: S$GLB,,, | Performed by: PHYSICIAN ASSISTANT

## 2025-05-19 RX ORDER — LEVOTHYROXINE SODIUM 50 UG/1
50 TABLET ORAL
Qty: 90 TABLET | Refills: 3 | Status: SHIPPED | OUTPATIENT
Start: 2025-05-19 | End: 2026-05-19

## 2025-05-19 NOTE — PROGRESS NOTES
SUBJECTIVE:    Patient ID: Tori Escalona is a 49 y.o. female.    Chief Complaint: Follow-up (No bottles// no refills needed//pt states she have no complaints today//pt agrees to pap )    History of Present Illness    CHIEF COMPLAINT:  Tori presents today for follow-up    VARICOSE VEINS:  She has tortuous varicose veins that are not painful. She notes variable appearance of the veins, with some days looking more prominent than others. She expresses cosmetic concerns about their appearance.    GYNECOLOGIC HISTORY:  She reports light menstrual periods lasting 2-3 days, which became lighter after IUD removal.    MEDICAL HISTORY:  She has a history of a breast lump monitored by Dr. Bermudez in the previous year.    MEDICATIONS:  She takes Synthroid 50 mcg daily.    LABS:  Lipid panel showed favorable results with LDL at 71 and elevated HDL. Fasting blood sugar was 91. Renal function, liver function, electrolytes, and thyroid function tests were within normal limits. Blood counts showed improvement compared to previous year. Urinalysis revealed trace blood and white blood cells; she denies urinary symptoms including frequency or burning.      ROS:  General: -fever, -chills, -fatigue, -weight gain, -weight loss  Eyes: -vision changes, -redness, -discharge  ENT: -ear pain, -nasal congestion, -sore throat  Cardiovascular: -chest pain, -palpitations, -lower extremity edema  Respiratory: -cough, -shortness of breath  Gastrointestinal: -abdominal pain, -nausea, -vomiting, -diarrhea, -constipation, -blood in stool  Genitourinary: -dysuria, -hematuria, -frequency  Musculoskeletal: -joint pain, -muscle pain  Skin: -rash, -lesion  Neurological: -headache, -dizziness, -numbness, -tingling  Psychiatric: -anxiety, -depression, -sleep difficulty         Telephone on 04/09/2025   Component Date Value Ref Range Status    Color, UA 04/24/2025 YELLOW  YELLOW Final    Appearance, UA 04/24/2025 CLEAR  CLEAR Final    Specific [place], UA  04/24/2025 1.026  1.001 - 1.035 Final    pH, UA 04/24/2025 5.5  5.0 - 8.0 Final    Glucose, UA 04/24/2025 NEGATIVE  NEGATIVE Final    Bilirubin, UA 04/24/2025 NEGATIVE  NEGATIVE Final    Ketones, UA 04/24/2025 NEGATIVE  NEGATIVE Final    Occult Blood UA 04/24/2025 TRACE (A)  NEGATIVE Final    Protein, UA 04/24/2025 NEGATIVE  NEGATIVE Final    Nitrite, UA 04/24/2025 NEGATIVE  NEGATIVE Final    Leukocytes, UA 04/24/2025 TRACE (A)  NEGATIVE Final    WBC Casts, UA 04/24/2025 NONE SEEN  < OR = 5 /HPF Final    RBC Casts, UA 04/24/2025 NONE SEEN  < OR = 2 /HPF Final    Squam Epithel, UA 04/24/2025 6-10 (A)  < OR = 5 /HPF Final    Bacteria, UA 04/24/2025 FEW (A)  NONE SEEN /HPF Final    Hyaline Casts, UA 04/24/2025 NONE SEEN  NONE SEEN /LPF Final    Service Cmt: 04/24/2025 SEE COMMENT   Final    Reflexive Urine Culture 04/24/2025 SEE COMMENT   Final    Urine Culture, Routine 04/24/2025 SEE COMMENT   Final    TSH w/reflex to FT4 04/24/2025 1.61  mIU/L Final    Cholesterol 04/24/2025 133  <200 mg/dL Final    HDL 04/24/2025 50  > OR = 50 mg/dL Final    Triglycerides 04/24/2025 43  <150 mg/dL Final    LDL Cholesterol 04/24/2025 71  mg/dL (calc) Final    HDL/Cholesterol Ratio 04/24/2025 2.7  <5.0 (calc) Final    Non HDL Chol. (LDL+VLDL) 04/24/2025 83  <130 mg/dL (calc) Final    WBC 04/24/2025 6.2  3.8 - 10.8 Thousand/uL Final    RBC 04/24/2025 4.29  3.80 - 5.10 Million/uL Final    Hemoglobin 04/24/2025 12.6  11.7 - 15.5 g/dL Final    Hematocrit 04/24/2025 39.9  35.0 - 45.0 % Final    MCV 04/24/2025 93.0  80.0 - 100.0 fL Final    MCH 04/24/2025 29.4  27.0 - 33.0 pg Final    MCHC 04/24/2025 31.6 (L)  32.0 - 36.0 g/dL Final    RDW 04/24/2025 11.7  11.0 - 15.0 % Final    Platelets 04/24/2025 264  140 - 400 Thousand/uL Final    MPV 04/24/2025 10.5  7.5 - 12.5 fL Final    Neutrophils, Abs 04/24/2025 3,912  1,500 - 7,800 cells/uL Final    Lymph # 04/24/2025 1,724  850 - 3,900 cells/uL Final    Mono # 04/24/2025 409  200 - 950 cells/uL  "Final    Eos # 04/24/2025 118  15 - 500 cells/uL Final    Baso # 04/24/2025 37  0 - 200 cells/uL Final    Neutrophils Relative 04/24/2025 63.1  % Final    Lymph % 04/24/2025 27.8  % Final    Mono % 04/24/2025 6.6  % Final    Eosinophil % 04/24/2025 1.9  % Final    Basophil % 04/24/2025 0.6  % Final    Glucose 04/24/2025 91  65 - 99 mg/dL Final    BUN 04/24/2025 12  7 - 25 mg/dL Final    Creatinine 04/24/2025 0.62  0.50 - 0.99 mg/dL Final    eGFR 04/24/2025 110  > OR = 60 mL/min/1.73m2 Final    BUN/Creatinine Ratio 04/24/2025 SEE NOTE:  6 - 22 (calc) Final    Sodium 04/24/2025 138  135 - 146 mmol/L Final    Potassium 04/24/2025 3.9  3.5 - 5.3 mmol/L Final    Chloride 04/24/2025 104  98 - 110 mmol/L Final    CO2 04/24/2025 27  20 - 32 mmol/L Final    Calcium 04/24/2025 8.8  8.6 - 10.2 mg/dL Final    Total Protein 04/24/2025 6.9  6.1 - 8.1 g/dL Final    Albumin 04/24/2025 4.1  3.6 - 5.1 g/dL Final    Globulin, Total 04/24/2025 2.8  1.9 - 3.7 g/dL (calc) Final    Albumin/Globulin Ratio 04/24/2025 1.5  1.0 - 2.5 (calc) Final    Total Bilirubin 04/24/2025 0.6  0.2 - 1.2 mg/dL Final    Alkaline Phosphatase 04/24/2025 51  31 - 125 U/L Final    AST 04/24/2025 12  10 - 35 U/L Final    ALT 04/24/2025 11  6 - 29 U/L Final       Past Medical History:   Diagnosis Date    Thyroid disease      Past Surgical History:   Procedure Laterality Date    BARIATRIC SURGERY  2021     Family History   Problem Relation Name Age of Onset    Osteoporosis Mother      Hypertension Father         Marital Status:   Alcohol History:  reports no history of alcohol use.  Tobacco History:  reports that she has never smoked. She has never used smokeless tobacco.  Drug History:  reports no history of drug use.    Review of patient's allergies indicates:  No Known Allergies  Current Medications[1]    Objective:      Vitals:    05/19/25 0841   BP: 104/66   Pulse: 64   Resp: 18   SpO2: 97%   Weight: 93.4 kg (206 lb)   Height: 5' 7" (1.702 m) "     Physical Exam    General: No acute distress. Well-developed. Well-nourished.  Eyes: EOMI. Sclerae anicteric.  HENT: Normocephalic. Atraumatic. Nares patent. Moist oral mucosa.  Ears: Bilateral TMs clear. Bilateral EACs clear.  Cardiovascular: Regular rate. Regular rhythm. No murmurs. No rubs. No gallops. Normal S1, S2.  Respiratory: Normal respiratory effort. Clear to auscultation bilaterally. No rales. No rhonchi. No wheezing.  Abdomen: Soft. Non-tender. Non-distended. Normoactive bowel sounds.  Musculoskeletal: No  obvious deformity.  Extremities: No lower extremity edema.  Neurological: Alert & oriented x3. No slurred speech. Normal gait.  Psychiatric: Normal mood. Normal affect. Good insight. Good judgment.  Skin: Warm. Dry. No rash.         Assessment:       Assessment & Plan    - Assessed superficial varicose vein on leg as cosmetic issue, not requiring medical intervention unless it becomes painful.  - Reviewed recent lab results, noting normal fasting glucose, renal function, liver function, electrolytes, and blood counts.  - Evaluated lipid panel, noting favorable LDL and HDL levels.  - Thyroid function normal based on lab results.  - Reviewed urinalysis results, noting trace blood and white cells, but no bacterial growth or urinary symptoms.  - Considered menstrual cycle as potential cause for trace blood in urine.  - Determined annual mammogram screening appropriate, planning to order diagnostic mammogram with option for US due to previous breast lump history.    ## VARICOSE VEINS:  - Explained the nature of superficial varicose veins to patient.  - Current assessment shows they are swollen and tortuous, but not painful, representing primarily a cosmetic issue without blood flow impairment.  - Advised patient to monitor for pain development; if pain occurs, will consider referral to vascular surgeon for potential laser treatment.    ## HYPOTHYROIDISM:  - Continued Synthroid 50 mcg daily.  - Discussed  medication compliance options, including taking all 7 Synthroid pills (350 mcg) for the week on a single day if preferred.  - Prescription refilled for a full year.    ## BREAST LUMP:  - Ordered annual diagnostic mammogram to be completed in December with option for same-day ultrasound if densities are seen to recheck breast lump.    ## MICROSCOPIC HEMATURIA:  - Noted urinalysis shows trace amount of blood and some white cells.  - Tori denies urinary symptoms such as frequency or burning, and no pathologic bacteria growth was detected.  - Presence of skin cells suggests possible contamination; trace blood not interpreted as clinically significant at this time.    ## GENERAL HEALTH MANAGEMENT:  - Discussed significance of lipid panel results, emphasizing importance of high HDL and low LDL cholesterol levels.  - Informed patient about changes in PAP smear frequency recommendations.  - Referred patient to Dr. Candelaria for gynecology well-woman exam and PAP smear, and to Dr. Mcgowan for eye exam.    ## FOLLOW-UP:  - Follow up in 1 year for well-woman exam and updated PAP smear.  - Tori advised to contact office through [employer] system if any urgent concerns arise before next scheduled visit.       Plan:       Well woman exam  -     Ambulatory referral/consult to Obstetrics / Gynecology; Future; Expected date: 05/26/2025    Acquired hypothyroidism  Comments:  TSH at goal. refills of current dose. continue as is.  Orders:  -     levothyroxine (SYNTHROID) 50 MCG tablet; Take 1 tablet (50 mcg total) by mouth before breakfast.  Dispense: 90 tablet; Refill: 3    Eye exam, routine  -     Ambulatory referral/consult to Ophthalmology; Future; Expected date: 05/19/2025      Follow up in about 1 year (around 5/19/2026) for Annual Physical.    This note was generated with the assistance of ambient listening technology. Verbal consent was obtained by the patient and accompanying visitor(s) for the recording of  patient appointment to facilitate this note. I attest to having reviewed and edited the generated note for accuracy, though some syntax or spelling errors may persist. Please contact the author of this note for any clarification.          5/19/2025 Rachid Nevarez PA-C           [1]   Current Outpatient Medications:     levothyroxine (SYNTHROID) 50 MCG tablet, Take 1 tablet (50 mcg total) by mouth before breakfast., Disp: 90 tablet, Rfl: 3

## 2025-08-31 ENCOUNTER — PATIENT MESSAGE (OUTPATIENT)
Dept: FAMILY MEDICINE | Facility: CLINIC | Age: 50
End: 2025-08-31
Payer: OTHER GOVERNMENT